# Patient Record
Sex: MALE | Race: WHITE | NOT HISPANIC OR LATINO | Employment: FULL TIME | URBAN - METROPOLITAN AREA
[De-identification: names, ages, dates, MRNs, and addresses within clinical notes are randomized per-mention and may not be internally consistent; named-entity substitution may affect disease eponyms.]

---

## 2019-06-04 ENCOUNTER — CONVERSION ENCOUNTER (OUTPATIENT)
Dept: FAMILY MEDICINE CLINIC | Facility: CLINIC | Age: 54
End: 2019-06-04

## 2019-06-05 VITALS
DIASTOLIC BLOOD PRESSURE: 87 MMHG | SYSTOLIC BLOOD PRESSURE: 139 MMHG | BODY MASS INDEX: 31.92 KG/M2 | OXYGEN SATURATION: 98 % | HEART RATE: 90 BPM | HEIGHT: 71 IN | WEIGHT: 228 LBS

## 2019-06-06 NOTE — PROGRESS NOTES
Visit Type:  Acute Visit  Referring Provider:  Nicki Galloway NP  Primary Provider:  Nicki Galloway NP    CC:  rash.    History of Present Illness:  54-year-old obese white male with history of GERD who comes in with severe large whelping hives all over extremities and back with some angioedema and swelling in the face after mowing in the field on Saturday.   He denies any dyspnea at this time or   difficulty breathing.   I am giving him Depo-Medrol 80 placing him on oral steroids Zyrtec and Zantac and he is to call me Thursday if he does not a great improvement.   I instructed patient if he develops any difficulty breathing or swallowing to go to   the emergency room immediately   blood pressure 138/86 heart rate 90 he denies any chest pain,  dyspnea,  tachycardia,  dizziness or edema     Depo-Medrol 80 mg IM now   prednisone 10 mg taper dose   Zyrtec 10 mg twice a day   Zantac 150 mg twice a day   off work till Friday      Vital Signs:    Patient Profile:    53 Years Old Male  Height:     71 inches  Weight:     228 pounds  BMI:        31.80     O2 Sat:     98 %  Temp:       98.0 degrees F oral  Pulse rate: 90 / minute  Pulse rhythm:   regular  BP Sittin / 87  (right arm)    Cuff size:  large      Problems: Active problems were reviewed with the patient during this visit.  Medications: Medications were reviewed with the patient during this visit.  Allergies: Allergies were reviewed with the patient during this visit.  No Known Allergy.  No Known Drug Allergy.        Vitals Entered By: Ruth Hernandez MA (2019 1:13 PM)    Past History  Past Medical History (reviewed - no changes required): None  Surgical History (reviewed - no changes required): None  Family History (reviewed - no changes required): None  Social History (reviewed - no changes required): None    Family History Summary:      Reviewed history Last on 2019 and no changes required:2019      General Comments -  FH:  None    Social History:     Reviewed history from 03/14/2019 and no changes required:        None      Risk Factors:     Smoked Tobacco Use:  Never smoker  Smokeless Tobacco Use:  Never  Alcohol use:  no  Exercise:  yes  Seatbelt use:  100 %  Sun Exposure:  occasionally        Review of Systems     General        skin rash      Physical Exam    General:      obese.    Ears:      L TM bulging and R TM bulging.    Nose:      turbinates swollen.    Mouth:      post nasal drip.    Lungs:      clear bilaterally to auscultation.    Heart:      non-displaced PMI, chest non-tender; regular rate and rhythm, S1, S2 without murmurs, rubs, or gallops  Abdomen:       normal bowel sounds; no hepatosplenomegaly no ventral,umbilical hernias or masses noted.    Msk:      no deformity or scoliosis noted of thoracic or lumbar spine.    Extremities:      no clubbing, cyanosis, edema, or deformity noted with normal full range of motion of joints of all four extremities   Neurologic:      no focal deficits, cranial nerves II-XII grossly intact with normal sensation, reflexes, coordination, muscle strength and tone.    Skin:       large groups of red whelping hives covering entire body with mild angioedema and swelling of face.   Patient said on Saturday and actually had some mild shortness of air but that has resolved  Psych:      alert and cooperative; normal mood and affect; normal attention span and concentration.        Blood Pressure:  Today's BP: 139/87 mm Hg          Impression & Recommendations:    Problem # 1:  Rash (ICD-782.1) (YMX40-M40)  Assessment: New    Orders:  Depo-Medrol 80 mg ()  Administration of Injections  (42528)      Medications Added to Medication List This Visit:  1)  Ranitidine Hcl 150 Mg Oral Tablet (Ranitidine hcl) .... Take one (1) tablet by mouth twice a day  2)  Zyrtec Allergy 10 Mg Oral Tablet (Cetirizine hcl) .... Take one (1) tablet by mouth twice a day  3)  Prednisone 10 Mg Oral Tablet  (Prednisone) .... Take 4 tabs x 2 days then 3 tabs x 2 days, 2 tabs x 2 days and 1 tab x 2 days.    Complete Medication List:  1)  Ranitidine Hcl 150 Mg Oral Tablet (Ranitidine hcl) .... Take one (1) tablet by mouth twice a day  2)  Zyrtec Allergy 10 Mg Oral Tablet (Cetirizine hcl) .... Take one (1) tablet by mouth twice a day  3)  Prednisone 10 Mg Oral Tablet (Prednisone) .... Take 4 tabs x 2 days then 3 tabs x 2 days, 2 tabs x 2 days and 1 tab x 2 days.  4)  Atorvastatin Calcium 10 Mg Oral Tablet (Atorvastatin calcium) .... One tab at bedtime  5)  Omeprazole 20 Mg Oral Tablet Delayed Release (Omeprazole) .... Take 1 tablet by mouth daily  6)  Aleve 220 Mg Oral Capsule (Naproxen sodium) .... Take one (1) tablet by mouth twice a day prn        Patient Instructions:  1)   take steroids Zyrtec and Zantac as directed and call Thursday if no improvement rash  2)   avoid sunlight,  hot water,  fragrances or sweating  3)   if rash worsens or if difficulty breathing go to the emergency room immediately  4)   off work till Friday    Medications:  RANITIDINE  MG ORAL TABLET (RANITIDINE HCL) Take one (1) tablet by mouth twice a day  #20[Tablet] x 0      Entered by: Ruth Hernandez MA      Authorized by:  Nicki Galloway NP      Electronically signed by:   Ruth Hernandez MA on 06/04/2019      Method used:    Electronically to               [facility] (retail)              [place]  [address]              Ph: [phone]              Fax: [phone]      Note to Pharmacy: Route: ORAL;       RxID:   [id number]  ZYRTEC ALLERGY 10 MG ORAL TABLET (CETIRIZINE HCL) Take one (1) tablet by mouth twice a day  #20[Tablet] x 0      Entered by: Ruth Hernandez MA      Authorized by:  Nicki Galloway NP      Electronically signed by:   Ruth Hernandez MA on 06/04/2019      Method used:    Electronically to               [facility] (retail)              [address]  [place]  [address]              Ph: [phone]              Fax: [phone]      Note to Pharmacy: Route: ORAL;       RxID:   [id number]  PREDNISONE 10 MG ORAL TABLET (PREDNISONE) Take 4 tabs x 2 days then 3 tabs x 2 days, 2 tabs x 2 days and 1 tab x 2 days.  #20[Tablet] x 0      Entered by: Ruth Hernandez MA      Authorized by:  Nicki Galloway NP      Electronically signed by:   Ruth Hernandez MA on 06/04/2019      Method used:    Electronically to               [id number]* (retail)              [address] [facility] [place]  [address]              Ph: [phone]              Fax: [phone]      Note to Pharmacy: Route: ORAL;       RxID:   [id number]                Medication Administration    Injection # 1:     Medication: Depomedrol 80mg     Diagnosis: Rash (ICD-782.1) (NOI25-N30)     Route: IM     Site: L deltoid     Exp Date: 08/31/2019     Lot #: [id number]     Mfr: Pharmacia     Given by: Ruth Hernandez MA     Date Given:06/04/2019 1:51 PM     Comments: NDC # [id number]     Tolerated w/o complications    Orders Added:  1)  Depo-Medrol 80 mg []  2)  Administration of Injections  [[id number]]  3)  Ofc Vst, Est Level III [50936]  ]      Electronically signed by Nicki Galloway NP on 06/04/2019 at 2:52 PM  ________________________________________________________________________       Disclaimer: Converted Note message may not contain all data elements that existed in the legacy source system. Please see [employer] for the original note details.

## 2020-07-15 ENCOUNTER — OFFICE VISIT (OUTPATIENT)
Dept: FAMILY MEDICINE CLINIC | Facility: CLINIC | Age: 55
End: 2020-07-15

## 2020-07-15 VITALS
WEIGHT: 233 LBS | SYSTOLIC BLOOD PRESSURE: 154 MMHG | HEART RATE: 87 BPM | TEMPERATURE: 98 F | OXYGEN SATURATION: 97 % | BODY MASS INDEX: 31.56 KG/M2 | DIASTOLIC BLOOD PRESSURE: 72 MMHG | HEIGHT: 72 IN

## 2020-07-15 DIAGNOSIS — N52.9 ERECTILE DYSFUNCTION, UNSPECIFIED ERECTILE DYSFUNCTION TYPE: ICD-10-CM

## 2020-07-15 DIAGNOSIS — Z12.5 SCREENING PSA (PROSTATE SPECIFIC ANTIGEN): ICD-10-CM

## 2020-07-15 DIAGNOSIS — K21.9 GASTROESOPHAGEAL REFLUX DISEASE WITHOUT ESOPHAGITIS: Primary | ICD-10-CM

## 2020-07-15 DIAGNOSIS — Z00.00 PREVENTATIVE HEALTH CARE: ICD-10-CM

## 2020-07-15 DIAGNOSIS — Z12.11 COLON CANCER SCREENING: ICD-10-CM

## 2020-07-15 DIAGNOSIS — N40.1 BENIGN PROSTATIC HYPERPLASIA WITH LOWER URINARY TRACT SYMPTOMS, SYMPTOM DETAILS UNSPECIFIED: ICD-10-CM

## 2020-07-15 PROCEDURE — 99214 OFFICE O/P EST MOD 30 MIN: CPT | Performed by: NURSE PRACTITIONER

## 2020-07-15 RX ORDER — SILDENAFIL 100 MG/1
100 TABLET, FILM COATED ORAL DAILY PRN
Qty: 10 TABLET | Refills: 2 | Status: SHIPPED | OUTPATIENT
Start: 2020-07-15 | End: 2021-06-21 | Stop reason: SDUPTHER

## 2020-07-15 RX ORDER — TAMSULOSIN HYDROCHLORIDE 0.4 MG/1
1 CAPSULE ORAL DAILY
Qty: 30 CAPSULE | Refills: 2 | Status: SHIPPED | OUTPATIENT
Start: 2020-07-15 | End: 2020-10-12

## 2020-07-15 NOTE — PATIENT INSTRUCTIONS
Flomax as directed if dribbling does not improve call office  Viagra as needed for ED  Schedule appointment for blood work fasting  Keep appointment for colonoscopy  Schedule eye exam

## 2020-07-15 NOTE — PROGRESS NOTES
"    Gomez Mercedes is a 54 y.o. male.     54-year-old obese white male with history of GERD who comes in today for annual visit and fasting blood work.  Patient has new complaint of dribbling toward the end of urination when he is trying to finish urinating.  He states this is been going on for couple years but is getting worse now. he denies any weakened stream or more frequent urination or nocturia.  Last PSA was normal I am placing him on Flomax and will be repeating PSA for this year.  If symptoms do not resolve we will send to urology  Blood pressure 154/72 heart rate 86 he denies any chest pain, dyspnea, tachycardia or dizziness.  Patient's blood pressures run in the 120s to 130s over 70s and 80s at home  Weight is up 5 pounds at 233 and we discussed diet weight loss  Patient's GERD has not been acting up he is doing a lot better  He does have some problems with erectile dysfunction and at his request I am ordering him some Viagra  Patient needs to schedule an eye exam.  I am scheduling him a colonoscopy and PSA      Flomax 0.4 mg daily  Viagra 100 mg one fourth tab as needed  Fasting blood work  Colonoscopy  Schedule eye exam         The following portions of the patient's history were reviewed and updated as appropriate: allergies, current medications, past family history, past medical history, past social history, past surgical history and problem list.    Vitals:    07/15/20 1458   BP: 154/72   BP Location: Right arm   Patient Position: Sitting   Cuff Size: Large Adult   Pulse: 87   Temp: 98 °F (36.7 °C)   TempSrc: Temporal   SpO2: 97%   Weight: 106 kg (233 lb)   Height: 181.6 cm (71.5\")     Body mass index is 32.04 kg/m².    History reviewed. No pertinent past medical history.  Past Surgical History:   Procedure Laterality Date   • VASECTOMY       Family History   Problem Relation Age of Onset   • Diabetes Father        There is no immunization history on file for this patient.    No results found for " any previous visit.         Review of Systems   Constitutional: Negative.    HENT: Negative.    Respiratory: Negative.    Cardiovascular: Negative.    Gastrointestinal: Negative.    Genitourinary: Positive for decreased libido.        Dribbling at end of urination that is worsening   Musculoskeletal: Negative.    Skin: Negative.    Neurological: Negative.    Psychiatric/Behavioral: Negative.        Objective   Physical Exam   Constitutional: He appears well-developed and well-nourished.   Cardiovascular: Normal rate and regular rhythm.   Pulmonary/Chest: Effort normal and breath sounds normal.   Abdominal: Soft. Bowel sounds are normal.   Musculoskeletal: Normal range of motion.   Neurological: He is alert.   Skin: Skin is warm and dry.   Psychiatric: He has a normal mood and affect.       Procedures    Assessment/Plan   Gomez was seen today for hypertension and bladder issues.    Diagnoses and all orders for this visit:    Gastroesophageal reflux disease without esophagitis    Benign prostatic hyperplasia with lower urinary tract symptoms, symptom details unspecified    Erectile dysfunction, unspecified erectile dysfunction type    Preventative health care  -     CBC & Differential  -     Comprehensive Metabolic Panel  -     Lipid Panel With LDL / HDL Ratio    Screening PSA (prostate specific antigen)  -     PSA Screen    Other orders  -     tamsulosin (FLOMAX) 0.4 MG capsule 24 hr capsule; Take 1 capsule by mouth Daily.  -     sildenafil (Viagra) 100 MG tablet; Take 1 tablet by mouth Daily As Needed for Erectile Dysfunction.          Current Outpatient Medications:   •  sildenafil (Viagra) 100 MG tablet, Take 1 tablet by mouth Daily As Needed for Erectile Dysfunction., Disp: 10 tablet, Rfl: 2  •  tamsulosin (FLOMAX) 0.4 MG capsule 24 hr capsule, Take 1 capsule by mouth Daily., Disp: 30 capsule, Rfl: 2

## 2020-10-12 RX ORDER — TAMSULOSIN HYDROCHLORIDE 0.4 MG/1
CAPSULE ORAL
Qty: 30 CAPSULE | Refills: 2 | Status: SHIPPED | OUTPATIENT
Start: 2020-10-12 | End: 2021-05-18

## 2020-11-11 PROCEDURE — U0003 INFECTIOUS AGENT DETECTION BY NUCLEIC ACID (DNA OR RNA); SEVERE ACUTE RESPIRATORY SYNDROME CORONAVIRUS 2 (SARS-COV-2) (CORONAVIRUS DISEASE [COVID-19]), AMPLIFIED PROBE TECHNIQUE, MAKING USE OF HIGH THROUGHPUT TECHNOLOGIES AS DESCRIBED BY CMS-2020-01-R: HCPCS | Performed by: FAMILY MEDICINE

## 2020-11-13 ENCOUNTER — TELEPHONE (OUTPATIENT)
Dept: URGENT CARE | Facility: CLINIC | Age: 55
End: 2020-11-13

## 2021-05-18 ENCOUNTER — OFFICE VISIT (OUTPATIENT)
Dept: FAMILY MEDICINE CLINIC | Facility: CLINIC | Age: 56
End: 2021-05-18

## 2021-05-18 VITALS
DIASTOLIC BLOOD PRESSURE: 86 MMHG | SYSTOLIC BLOOD PRESSURE: 138 MMHG | HEART RATE: 67 BPM | HEIGHT: 72 IN | TEMPERATURE: 97.7 F | BODY MASS INDEX: 27.87 KG/M2 | OXYGEN SATURATION: 98 % | WEIGHT: 205.8 LBS

## 2021-05-18 DIAGNOSIS — Z12.11 COLON CANCER SCREENING: Primary | ICD-10-CM

## 2021-05-18 DIAGNOSIS — Z12.5 SCREENING PSA (PROSTATE SPECIFIC ANTIGEN): ICD-10-CM

## 2021-05-18 DIAGNOSIS — N52.8 OTHER MALE ERECTILE DYSFUNCTION: ICD-10-CM

## 2021-05-18 DIAGNOSIS — Z00.00 PREVENTATIVE HEALTH CARE: ICD-10-CM

## 2021-05-18 DIAGNOSIS — G44.40 DRUG-INDUCED HEADACHE, NOT ELSEWHERE CLASSIFIED, NOT INTRACTABLE: ICD-10-CM

## 2021-05-18 PROBLEM — R51.9 HEADACHE: Status: ACTIVE | Noted: 2021-05-18

## 2021-05-18 PROBLEM — I10 ESSENTIAL HYPERTENSION: Status: ACTIVE | Noted: 2021-05-18

## 2021-05-18 PROCEDURE — 99396 PREV VISIT EST AGE 40-64: CPT | Performed by: NURSE PRACTITIONER

## 2021-05-18 RX ORDER — AMLODIPINE BESYLATE 2.5 MG/1
2.5 TABLET ORAL
Qty: 30 TABLET | Refills: 2 | Status: SHIPPED | OUTPATIENT
Start: 2021-05-18 | End: 2021-06-26

## 2021-05-18 NOTE — PATIENT INSTRUCTIONS
Fasting blood work  Amlodipine 2.5 mg nightly  Monitor blood pressures with parameters given  Take Tylenol before Viagra to see if it helps with headaches  Covid vaccine as discussed  Schedule colonoscopy  Follow-up 6 months fasting

## 2021-05-18 NOTE — PROGRESS NOTES
"    Gomez Mercedes is a 55 y.o. male.     55-year-old white male with history of GERD who comes in today for annual visit and fasting blood work  Blood pressure 138/86 heart rate 66 patient states his pressure has been a little higher than normal.  He is on no medications for blood pressure unguinal put him on a very low-dose of amlodipine since his diastolic seems to be his main issue patient is going to monitor blood pressures and call the office if they are not in the parameters I gave him  Patient also takes Viagra for ED and states they give him headaches and I explained the reason why he gets headaches from the Viagra I instructed patient to take Tylenol before taking the Viagra to see if that would help  Patient has lost 27 pounds since I saw him with a weight of 206 a BMI 27.9 he has been using weight loss supplements which I am sure have stimulants which may be the cause for his elevated blood pressure also.  He does not do a lot of caffeine  Patient has not had a Covid vaccine we discussed that and he is probably going to end up getting the Pfizer  He is up-to-date on eye exam I am drawing a PSA today and he is going to schedule his colonoscopy in a green packet was given for this summer    Fasting blood work  Amlodipine 2.5 mg nightly  Monitor blood pressures with parameters given  Take Tylenol before Viagra to see if it helps with headaches  Covid vaccine as discussed  Schedule colonoscopy  Follow-up 6 months fasting       The following portions of the patient's history were reviewed and updated as appropriate: allergies, current medications, past family history, past medical history, past social history, past surgical history and problem list.    Vitals:    05/18/21 0846   BP: 138/86   BP Location: Right arm   Patient Position: Sitting   Cuff Size: Large Adult   Pulse: 67   Temp: 97.7 °F (36.5 °C)   TempSrc: Temporal   SpO2: 98%   Weight: 93.4 kg (205 lb 12.8 oz)   Height: 182.9 cm (72\")     Body " mass index is 27.91 kg/m².    History reviewed. No pertinent past medical history.  Past Surgical History:   Procedure Laterality Date   • VASECTOMY       Family History   Problem Relation Age of Onset   • Diabetes Father      Immunization History   Administered Date(s) Administered   • COVID-19 (PFIZER) 03/17/2021, 04/07/2021       Admission on 11/11/2020, Discharged on 11/11/2020   Component Date Value Ref Range Status   • SARS-CoV-2, FLAQUITO 11/11/2020 Detected* Not Detected Final    Comment: This nucleic acid amplification test was developed and its performance  characteristics determined by [employer]. Nucleic acid  amplification tests include PCR and TMA. This test has not been FDA  cleared or approved. This test has been authorized by FDA under an  Emergency Use Authorization (EUA). This test is only authorized for  the duration of time the declaration that circumstances exist  justifying the authorization of the emergency use of in vitro  diagnostic tests for detection of SARS-CoV-2 virus and/or diagnosis  of COVID-19 infection under section 564(b)(1) of the Act, 21 U.S.C.  360bbb-3(b) (1), unless the authorization is terminated or revoked  sooner.  When diagnostic testing is negative, the possibility of a false  negative result should be considered in the context of a patient's  recent exposures and the presence of clinical signs and symptoms  consistent with COVID-19. An individual without symptoms of COVID-19  and who is not shedding SARS-CoV-2 virus would                            expect to have a  negative (not detected) result in this assay.         Review of Systems   Constitutional: Negative.    HENT: Negative.    Respiratory: Negative.    Cardiovascular: Negative.    Gastrointestinal: Negative.    Genitourinary: Positive for erectile dysfunction.   Musculoskeletal: Negative.    Skin: Negative.    Neurological: Positive for headache.       Objective   Physical Exam  Constitutional:        Appearance: Normal appearance.   HENT:      Head: Normocephalic.   Cardiovascular:      Rate and Rhythm: Normal rate and regular rhythm.      Pulses: Normal pulses.      Heart sounds: Normal heart sounds.   Pulmonary:      Effort: Pulmonary effort is normal.   Abdominal:      General: Bowel sounds are normal.   Musculoskeletal:         General: Normal range of motion.      Cervical back: Normal range of motion.   Skin:     General: Skin is warm and dry.   Neurological:      General: No focal deficit present.      Mental Status: He is alert and oriented to person, place, and time.   Psychiatric:         Mood and Affect: Mood normal.         Behavior: Behavior normal.         Procedures    Assessment/Plan   Diagnoses and all orders for this visit:    1. Colon cancer screening (Primary)  -     Ambulatory Referral For Screening Colonoscopy    2. Preventative health care  -     CBC & Differential  -     Comprehensive Metabolic Panel  -     Lipid Panel With LDL / HDL Ratio    3. Screening PSA (prostate specific antigen)  -     PSA Screen    4. Other male erectile dysfunction    5. Drug-induced headache, not elsewhere classified, not intractable    6. BMI 27.0-27.9,adult    Other orders  -     amLODIPine (NORVASC) 2.5 MG tablet; Take 1 tablet by mouth every night at bedtime.  Dispense: 30 tablet; Refill: 2          Current Outpatient Medications:   •  amLODIPine (NORVASC) 2.5 MG tablet, Take 1 tablet by mouth every night at bedtime., Disp: 30 tablet, Rfl: 2  •  sildenafil (Viagra) 100 MG tablet, Take 1 tablet by mouth Daily As Needed for Erectile Dysfunction., Disp: 10 tablet, Rfl: 2

## 2021-05-19 LAB
ALBUMIN SERPL-MCNC: 4.3 G/DL (ref 3.8–4.9)
ALBUMIN/GLOB SERPL: 1.8 {RATIO} (ref 1.2–2.2)
ALP SERPL-CCNC: 75 IU/L (ref 48–121)
ALT SERPL-CCNC: 25 IU/L (ref 0–44)
AST SERPL-CCNC: 20 IU/L (ref 0–40)
BASOPHILS # BLD AUTO: 0 X10E3/UL (ref 0–0.2)
BASOPHILS NFR BLD AUTO: 1 %
BILIRUB SERPL-MCNC: <0.2 MG/DL (ref 0–1.2)
BUN SERPL-MCNC: 19 MG/DL (ref 6–24)
BUN/CREAT SERPL: 19 (ref 9–20)
CALCIUM SERPL-MCNC: 9.3 MG/DL (ref 8.7–10.2)
CHLORIDE SERPL-SCNC: 102 MMOL/L (ref 96–106)
CHOLEST SERPL-MCNC: 166 MG/DL (ref 100–199)
CO2 SERPL-SCNC: 27 MMOL/L (ref 20–29)
CREAT SERPL-MCNC: 0.99 MG/DL (ref 0.76–1.27)
EOSINOPHIL # BLD AUTO: 0.1 X10E3/UL (ref 0–0.4)
EOSINOPHIL NFR BLD AUTO: 2 %
ERYTHROCYTE [DISTWIDTH] IN BLOOD BY AUTOMATED COUNT: 13.3 % (ref 11.6–15.4)
GLOBULIN SER CALC-MCNC: 2.4 G/DL (ref 1.5–4.5)
GLUCOSE SERPL-MCNC: 104 MG/DL (ref 65–99)
HCT VFR BLD AUTO: 42.4 % (ref 37.5–51)
HDLC SERPL-MCNC: 46 MG/DL
HGB BLD-MCNC: 14.1 G/DL (ref 13–17.7)
IMM GRANULOCYTES # BLD AUTO: 0 X10E3/UL (ref 0–0.1)
IMM GRANULOCYTES NFR BLD AUTO: 0 %
LDLC SERPL CALC-MCNC: 98 MG/DL (ref 0–99)
LDLC/HDLC SERPL: 2.1 RATIO (ref 0–3.6)
LYMPHOCYTES # BLD AUTO: 3.2 X10E3/UL (ref 0.7–3.1)
LYMPHOCYTES NFR BLD AUTO: 56 %
MCH RBC QN AUTO: 30.9 PG (ref 26.6–33)
MCHC RBC AUTO-ENTMCNC: 33.3 G/DL (ref 31.5–35.7)
MCV RBC AUTO: 93 FL (ref 79–97)
MONOCYTES # BLD AUTO: 0.3 X10E3/UL (ref 0.1–0.9)
MONOCYTES NFR BLD AUTO: 6 %
NEUTROPHILS # BLD AUTO: 2 X10E3/UL (ref 1.4–7)
NEUTROPHILS NFR BLD AUTO: 35 %
PLATELET # BLD AUTO: 276 X10E3/UL (ref 150–450)
POTASSIUM SERPL-SCNC: 4.7 MMOL/L (ref 3.5–5.2)
PROT SERPL-MCNC: 6.7 G/DL (ref 6–8.5)
PSA SERPL-MCNC: 3.1 NG/ML (ref 0–4)
RBC # BLD AUTO: 4.56 X10E6/UL (ref 4.14–5.8)
SODIUM SERPL-SCNC: 142 MMOL/L (ref 134–144)
TRIGL SERPL-MCNC: 120 MG/DL (ref 0–149)
VLDLC SERPL CALC-MCNC: 22 MG/DL (ref 5–40)
WBC # BLD AUTO: 5.6 X10E3/UL (ref 3.4–10.8)

## 2021-06-21 NOTE — TELEPHONE ENCOUNTER
Caller: Gomez Mercedes    Relationship: Self    Best call back number: [phone]   Medication needed:   Requested Prescriptions     Pending Prescriptions Disp Refills   • sildenafil (Viagra) 100 MG tablet 10 tablet 2     Sig: Take 1 tablet by mouth Daily As Needed for Erectile Dysfunction.       When do you need the refill by: 06/21/21    What additional details did the patient provide when requesting the medication: PATIENT STATES HE IS OUT OF THIS MEDICATION    Does the patient have less than a 3 day supply:  [x] Yes  [] No    What is the patient's preferred pharmacy: [facility] [address] [place] [address] [phone] [facility] [phone]

## 2021-06-22 RX ORDER — SILDENAFIL 100 MG/1
100 TABLET, FILM COATED ORAL DAILY PRN
Qty: 10 TABLET | Refills: 2 | Status: SHIPPED | OUTPATIENT
Start: 2021-06-22 | End: 2021-12-01

## 2021-06-26 RX ORDER — AMLODIPINE BESYLATE 2.5 MG/1
2.5 TABLET ORAL
Qty: 90 TABLET | Refills: 0 | Status: SHIPPED | OUTPATIENT
Start: 2021-06-26 | End: 2021-11-23

## 2021-08-20 ENCOUNTER — ON CAMPUS - OUTPATIENT (AMBULATORY)
Dept: URBAN - METROPOLITAN AREA HOSPITAL 2 | Facility: HOSPITAL | Age: 56
End: 2021-08-20
Payer: COMMERCIAL

## 2021-08-20 VITALS
DIASTOLIC BLOOD PRESSURE: 66 MMHG | HEART RATE: 60 BPM | SYSTOLIC BLOOD PRESSURE: 103 MMHG | SYSTOLIC BLOOD PRESSURE: 141 MMHG | DIASTOLIC BLOOD PRESSURE: 88 MMHG | SYSTOLIC BLOOD PRESSURE: 118 MMHG | DIASTOLIC BLOOD PRESSURE: 70 MMHG | DIASTOLIC BLOOD PRESSURE: 74 MMHG | SYSTOLIC BLOOD PRESSURE: 108 MMHG | OXYGEN SATURATION: 98 % | HEART RATE: 69 BPM | DIASTOLIC BLOOD PRESSURE: 77 MMHG | WEIGHT: 195 LBS | TEMPERATURE: 97.8 F | DIASTOLIC BLOOD PRESSURE: 72 MMHG | HEART RATE: 84 BPM | HEART RATE: 68 BPM | OXYGEN SATURATION: 100 % | SYSTOLIC BLOOD PRESSURE: 124 MMHG | RESPIRATION RATE: 16 BRPM | SYSTOLIC BLOOD PRESSURE: 119 MMHG | RESPIRATION RATE: 17 BRPM | HEART RATE: 65 BPM | HEART RATE: 63 BPM | DIASTOLIC BLOOD PRESSURE: 87 MMHG | RESPIRATION RATE: 15 BRPM | OXYGEN SATURATION: 99 % | SYSTOLIC BLOOD PRESSURE: 140 MMHG | HEART RATE: 72 BPM | HEART RATE: 62 BPM | RESPIRATION RATE: 18 BRPM | RESPIRATION RATE: 12 BRPM | HEIGHT: 71 IN | SYSTOLIC BLOOD PRESSURE: 105 MMHG

## 2021-08-20 DIAGNOSIS — Z12.11 ENCOUNTER FOR SCREENING FOR MALIGNANT NEOPLASM OF COLON: ICD-10-CM

## 2021-08-20 DIAGNOSIS — K64.8 OTHER HEMORRHOIDS: ICD-10-CM

## 2021-08-20 PROCEDURE — 45378 DIAGNOSTIC COLONOSCOPY: CPT | Mod: 33 | Performed by: INTERNAL MEDICINE

## 2021-11-23 ENCOUNTER — OFFICE VISIT (OUTPATIENT)
Dept: FAMILY MEDICINE CLINIC | Facility: CLINIC | Age: 56
End: 2021-11-23

## 2021-11-23 VITALS
SYSTOLIC BLOOD PRESSURE: 140 MMHG | WEIGHT: 208.8 LBS | HEART RATE: 78 BPM | DIASTOLIC BLOOD PRESSURE: 90 MMHG | TEMPERATURE: 97.6 F | BODY MASS INDEX: 28.28 KG/M2 | HEIGHT: 72 IN | OXYGEN SATURATION: 99 %

## 2021-11-23 DIAGNOSIS — I10 ESSENTIAL HYPERTENSION: ICD-10-CM

## 2021-11-23 DIAGNOSIS — Z00.00 PREVENTATIVE HEALTH CARE: ICD-10-CM

## 2021-11-23 DIAGNOSIS — Z13.220 LIPID SCREENING: Primary | ICD-10-CM

## 2021-11-23 PROCEDURE — 99213 OFFICE O/P EST LOW 20 MIN: CPT | Performed by: NURSE PRACTITIONER

## 2021-11-23 RX ORDER — AMLODIPINE BESYLATE 5 MG/1
5 TABLET ORAL
Qty: 30 TABLET | Refills: 2 | Status: SHIPPED | OUTPATIENT
Start: 2021-11-23 | End: 2022-04-11

## 2021-11-23 RX ORDER — TAMSULOSIN HYDROCHLORIDE 0.4 MG/1
1 CAPSULE ORAL DAILY
COMMUNITY
Start: 2021-09-08 | End: 2023-01-30

## 2021-11-23 NOTE — PROGRESS NOTES
"    Gomez Mercedes is a 56 y.o. male.     56-year-old white male with history of GERD and hypertension who comes in today for follow-up visit  Blood pressure 140/90 heart rate 78 he denies any chest pain, dyspnea, tachycardia or dizziness.  Patient was placed on amlodipine last visit 2.5 I am increasing that to 5 mg and gave him parameters to follow and call the office if not in those parameters    Weight is up 3 pounds at 209 with a BMI of 28.3    Patient has had 3 Pfizer vaccines, is up-to-date on eye exam PSA and just had a colonoscopy that was normal that is due in 10 years            Fasting blood work  Increase amlodipine to 5 mg daily  Monitor blood pressure for parameters given and call the office  Follow-up 6 months       The following portions of the patient's history were reviewed and updated as appropriate: allergies, current medications, past family history, past medical history, past social history, past surgical history and problem list.    Vitals:    11/23/21 0809   BP: 140/90   BP Location: Left arm   Patient Position: Sitting   Cuff Size: Large Adult   Pulse: 78   Temp: 97.6 °F (36.4 °C)   TempSrc: Temporal   SpO2: 99%   Weight: 94.7 kg (208 lb 12.8 oz)   Height: 182.9 cm (72\")     Body mass index is 28.32 kg/m².    Past Medical History:   Diagnosis Date   • Hypertension      Past Surgical History:   Procedure Laterality Date   • VASECTOMY       Family History   Problem Relation Age of Onset   • Diabetes Father      Immunization History   Administered Date(s) Administered   • COVID-19 (PFIZER) 03/17/2021, 04/07/2021, 10/09/2021       Office Visit on 05/18/2021   Component Date Value Ref Range Status   • WBC 05/18/2021 5.6  3.4 - 10.8 x10E3/uL Final   • RBC 05/18/2021 4.56  4.14 - 5.80 x10E6/uL Final   • Hemoglobin 05/18/2021 14.1  13.0 - 17.7 g/dL Final   • Hematocrit 05/18/2021 42.4  37.5 - 51.0 % Final   • MCV 05/18/2021 93  79 - 97 fL Final   • MCH 05/18/2021 30.9  26.6 - 33.0 pg Final   • " MCHC 05/18/2021 33.3  31.5 - 35.7 g/dL Final   • RDW 05/18/2021 13.3  11.6 - 15.4 % Final   • Platelets 05/18/2021 276  150 - 450 x10E3/uL Final   • Neutrophil Rel % 05/18/2021 35  Not Estab. % Final   • Lymphocyte Rel % 05/18/2021 56  Not Estab. % Final   • Monocyte Rel % 05/18/2021 6  Not Estab. % Final   • Eosinophil Rel % 05/18/2021 2  Not Estab. % Final   • Basophil Rel % 05/18/2021 1  Not Estab. % Final   • Neutrophils Absolute 05/18/2021 2.0  1.4 - 7.0 x10E3/uL Final   • Lymphocytes Absolute 05/18/2021 3.2* 0.7 - 3.1 x10E3/uL Final   • Monocytes Absolute 05/18/2021 0.3  0.1 - 0.9 x10E3/uL Final   • Eosinophils Absolute 05/18/2021 0.1  0.0 - 0.4 x10E3/uL Final   • Basophils Absolute 05/18/2021 0.0  0.0 - 0.2 x10E3/uL Final   • Immature Granulocyte Rel % 05/18/2021 0  Not Estab. % Final   • Immature Grans Absolute 05/18/2021 0.0  0.0 - 0.1 x10E3/uL Final   • Glucose 05/18/2021 104* 65 - 99 mg/dL Final   • BUN 05/18/2021 19  6 - 24 mg/dL Final   • Creatinine 05/18/2021 0.99  0.76 - 1.27 mg/dL Final   • eGFR Non  Am 05/18/2021 87  >59 mL/min/1.73 Final   • eGFR African Am 05/18/2021 100  >59 mL/min/1.73 Final    Comment: **[facility] currently reports eGFR in compliance with the current**    recommendations of the National Kidney Foundation. [facility] will    update reporting as new guidelines are published from the NKF-ASN    Task force.     • BUN/Creatinine Ratio 05/18/2021 19  9 - 20 Final   • Sodium 05/18/2021 142  134 - 144 mmol/L Final   • Potassium 05/18/2021 4.7  3.5 - 5.2 mmol/L Final   • Chloride 05/18/2021 102  96 - 106 mmol/L Final   • Total CO2 05/18/2021 27  20 - 29 mmol/L Final   • Calcium 05/18/2021 9.3  8.7 - 10.2 mg/dL Final   • Total Protein 05/18/2021 6.7  6.0 - 8.5 g/dL Final   • Albumin 05/18/2021 4.3  3.8 - 4.9 g/dL Final   • Globulin 05/18/2021 2.4  1.5 - 4.5 g/dL Final   • A/G Ratio 05/18/2021 1.8  1.2 - 2.2 Final   • Total Bilirubin 05/18/2021 <0.2  0.0 - 1.2 mg/dL Final   • Alkaline  Phosphatase 05/18/2021 75  48 - 121 IU/L Final                  **Please note reference interval change**   • AST (SGOT) 05/18/2021 20  0 - 40 IU/L Final   • ALT (SGPT) 05/18/2021 25  0 - 44 IU/L Final   • Total Cholesterol 05/18/2021 166  100 - 199 mg/dL Final   • Triglycerides 05/18/2021 120  0 - 149 mg/dL Final   • HDL Cholesterol 05/18/2021 46  >39 mg/dL Final   • VLDL Cholesterol [place] 05/18/2021 22  5 - 40 mg/dL Final   • LDL Chol Calc (NIH) 05/18/2021 98  0 - 99 mg/dL Final   • LDL/HDL RATIO 05/18/2021 2.1  0.0 - 3.6 ratio Final    Comment:                                     LDL/HDL Ratio                                              Men  Women                                1/2 Avg.Risk  1.0    1.5                                    Avg.Risk  3.6    3.2                                 2X Avg.Risk  6.2    5.0                                 3X Avg.Risk  8.0    6.1     • PSA 05/18/2021 3.1  0.0 - 4.0 ng/mL Final    Comment: Roche ECLIA methodology.  According to the American Urological Association, Serum PSA should  decrease and remain at undetectable levels after radical  prostatectomy. The AUA defines biochemical recurrence as an initial  PSA value 0.2 ng/mL or greater followed by a subsequent confirmatory  PSA value 0.2 ng/mL or greater.  Values obtained with different assay methods or kits cannot be used  interchangeably. Results cannot be interpreted as absolute evidence  of the presence or absence of malignant disease.           Review of Systems   Constitutional: Negative.    HENT: Negative.    Respiratory: Negative.    Cardiovascular: Negative.    Gastrointestinal: Negative.    Genitourinary: Negative.    Musculoskeletal: Negative.    Skin: Negative.    Neurological: Negative.    Psychiatric/Behavioral: Negative.        Objective   Physical Exam  Constitutional:       Appearance: Normal appearance.   HENT:      Head: Normocephalic.   Cardiovascular:      Rate and Rhythm: Normal rate and regular rhythm.       Pulses: Normal pulses.   Pulmonary:      Effort: Pulmonary effort is normal.      Breath sounds: Normal breath sounds.   Abdominal:      General: Bowel sounds are normal.   Musculoskeletal:         General: Normal range of motion.   Skin:     General: Skin is warm and dry.   Neurological:      General: No focal deficit present.      Mental Status: He is alert and oriented to person, place, and time.   Psychiatric:         Mood and Affect: Mood normal.         Behavior: Behavior normal.         Procedures    Assessment/Plan   Diagnoses and all orders for this visit:    1. Lipid screening (Primary)  -     Lipid Panel With LDL / HDL Ratio    2. Preventative health care  -     Comprehensive Metabolic Panel    3. Essential hypertension    Other orders  -     amLODIPine (NORVASC) 5 MG tablet; Take 1 tablet by mouth every night at bedtime.  Dispense: 30 tablet; Refill: 2          Current Outpatient Medications:   •  sildenafil (Viagra) 100 MG tablet, Take 1 tablet by mouth Daily As Needed for Erectile Dysfunction., Disp: 10 tablet, Rfl: 2  •  tamsulosin (FLOMAX) 0.4 MG capsule 24 hr capsule, Take 1 capsule by mouth Daily., Disp: , Rfl:   •  amLODIPine (NORVASC) 5 MG tablet, Take 1 tablet by mouth every night at bedtime., Disp: 30 tablet, Rfl: 2

## 2021-11-23 NOTE — PATIENT INSTRUCTIONS
Fasting blood work  Increase amlodipine to 5 mg daily  Monitor blood pressure for parameters given and call the office  Follow-up 6 months

## 2021-11-24 LAB
ALBUMIN SERPL-MCNC: 4.8 G/DL (ref 3.8–4.9)
ALBUMIN/GLOB SERPL: 2 {RATIO} (ref 1.2–2.2)
ALP SERPL-CCNC: 72 IU/L (ref 44–121)
ALT SERPL-CCNC: 23 IU/L (ref 0–44)
AST SERPL-CCNC: 19 IU/L (ref 0–40)
BILIRUB SERPL-MCNC: 0.4 MG/DL (ref 0–1.2)
BUN SERPL-MCNC: 20 MG/DL (ref 6–24)
BUN/CREAT SERPL: 17 (ref 9–20)
CALCIUM SERPL-MCNC: 9.7 MG/DL (ref 8.7–10.2)
CHLORIDE SERPL-SCNC: 100 MMOL/L (ref 96–106)
CHOLEST SERPL-MCNC: 190 MG/DL (ref 100–199)
CO2 SERPL-SCNC: 26 MMOL/L (ref 20–29)
CREAT SERPL-MCNC: 1.18 MG/DL (ref 0.76–1.27)
GLOBULIN SER CALC-MCNC: 2.4 G/DL (ref 1.5–4.5)
GLUCOSE SERPL-MCNC: 110 MG/DL (ref 65–99)
HDLC SERPL-MCNC: 48 MG/DL
LDLC SERPL CALC-MCNC: 122 MG/DL (ref 0–99)
LDLC/HDLC SERPL: 2.5 RATIO (ref 0–3.6)
POTASSIUM SERPL-SCNC: 4.3 MMOL/L (ref 3.5–5.2)
PROT SERPL-MCNC: 7.2 G/DL (ref 6–8.5)
SODIUM SERPL-SCNC: 139 MMOL/L (ref 134–144)
TRIGL SERPL-MCNC: 110 MG/DL (ref 0–149)
VLDLC SERPL CALC-MCNC: 20 MG/DL (ref 5–40)

## 2021-12-01 RX ORDER — SILDENAFIL 100 MG/1
TABLET, FILM COATED ORAL
Qty: 10 TABLET | Refills: 0 | Status: SHIPPED | OUTPATIENT
Start: 2021-12-01 | End: 2021-12-22

## 2021-12-22 RX ORDER — SILDENAFIL 100 MG/1
TABLET, FILM COATED ORAL
Qty: 10 TABLET | Refills: 0 | Status: SHIPPED | OUTPATIENT
Start: 2021-12-22 | End: 2022-02-10

## 2022-02-10 RX ORDER — SILDENAFIL 100 MG/1
TABLET, FILM COATED ORAL
Qty: 10 TABLET | Refills: 0 | Status: SHIPPED | OUTPATIENT
Start: 2022-02-10 | End: 2022-04-11

## 2022-04-11 RX ORDER — AMLODIPINE BESYLATE 5 MG/1
TABLET ORAL
Qty: 30 TABLET | Refills: 0 | Status: SHIPPED | OUTPATIENT
Start: 2022-04-11 | End: 2022-05-23

## 2022-04-11 RX ORDER — SILDENAFIL 100 MG/1
TABLET, FILM COATED ORAL
Qty: 10 TABLET | Refills: 0 | Status: SHIPPED | OUTPATIENT
Start: 2022-04-11 | End: 2022-05-25

## 2022-05-18 ENCOUNTER — OFFICE VISIT (OUTPATIENT)
Dept: FAMILY MEDICINE CLINIC | Facility: CLINIC | Age: 57
End: 2022-05-18

## 2022-05-18 VITALS
DIASTOLIC BLOOD PRESSURE: 88 MMHG | SYSTOLIC BLOOD PRESSURE: 138 MMHG | HEART RATE: 64 BPM | OXYGEN SATURATION: 99 % | HEIGHT: 72 IN | TEMPERATURE: 97.7 F | BODY MASS INDEX: 28.25 KG/M2 | WEIGHT: 208.6 LBS

## 2022-05-18 DIAGNOSIS — M25.562 ACUTE PAIN OF LEFT KNEE: ICD-10-CM

## 2022-05-18 DIAGNOSIS — Z12.5 SCREENING PSA (PROSTATE SPECIFIC ANTIGEN): ICD-10-CM

## 2022-05-18 DIAGNOSIS — E78.00 ELEVATED LDL CHOLESTEROL LEVEL: ICD-10-CM

## 2022-05-18 DIAGNOSIS — R73.09 ELEVATED GLUCOSE: Primary | ICD-10-CM

## 2022-05-18 PROCEDURE — 99213 OFFICE O/P EST LOW 20 MIN: CPT | Performed by: NURSE PRACTITIONER

## 2022-05-18 NOTE — PROGRESS NOTES
"Gomez Mercedes is a 56 y.o. male.     56-year-old white male with GERD, hypertension who comes in today for follow-up visit    Blood pressure 138/88 heart rate 64 he denies any chest pain, dyspnea, tachycardia or dizziness    Patient states he hurt his knee playing basketball about 3 weeks ago it swelled up pretty badly and was painful.  He iced it quite a bit and states the swelling is much better but is still sore.  We will get an x-ray today if knee continues to be a problem we will send to Ortho    Weight is 209 with a BMI 28.3.  He has had 3 COVID vaccines is up-to-date on eye exam PSA and his colonoscopy is due in August 2031            Left knee x-ray  Ibuprofen and ice at home  Wear knee brace  If no improvement within 2 weeks call office for orthopedic referral  Fasting blood work follow-up 6 months         The following portions of the patient's history were reviewed and updated as appropriate: allergies, current medications, past family history, past medical history, past social history, past surgical history and problem list.    Vitals:    05/18/22 1019   BP: 138/88   BP Location: Right arm   Patient Position: Sitting   Cuff Size: Large Adult   Pulse: 64   Temp: 97.7 °F (36.5 °C)   TempSrc: Temporal   SpO2: 99%   Weight: 94.6 kg (208 lb 9.6 oz)   Height: 182.9 cm (72\")     Body mass index is 28.29 kg/m².    Past Medical History:   Diagnosis Date   • Hypertension      Past Surgical History:   Procedure Laterality Date   • VASECTOMY       Family History   Problem Relation Age of Onset   • Diabetes Father      Immunization History   Administered Date(s) Administered   • COVID-19 (PFIZER) PURPLE CAP 03/17/2021, 04/07/2021, 10/09/2021       Office Visit on 11/23/2021   Component Date Value Ref Range Status   • Glucose 11/23/2021 110 (A) 65 - 99 mg/dL Final   • BUN 11/23/2021 20  6 - 24 mg/dL Final   • Creatinine 11/23/2021 1.18  0.76 - 1.27 mg/dL Final   • eGFR Non  Am 11/23/2021 70  >59 " mL/min/1.73 Final   • eGFR  Am 11/23/2021 80  >59 mL/min/1.73 Final    Comment: **In accordance with recommendations from the NKF-ASN Task force,**    [facility] the [facility] eGFR calculation to the    2021 CKD-EPI creatinine equation that estimates kidney function    without a race variable.     • BUN/Creatinine Ratio 11/23/2021 17  9 - 20 Final   • Sodium 11/23/2021 139  134 - 144 mmol/L Final   • Potassium 11/23/2021 4.3  3.5 - 5.2 mmol/L Final   • Chloride 11/23/2021 100  96 - 106 mmol/L Final   • Total CO2 11/23/2021 26  20 - 29 mmol/L Final   • Calcium 11/23/2021 9.7  8.7 - 10.2 mg/dL Final   • Total Protein 11/23/2021 7.2  6.0 - 8.5 g/dL Final   • Albumin 11/23/2021 4.8  3.8 - 4.9 g/dL Final   • Globulin 11/23/2021 2.4  1.5 - 4.5 g/dL Final   • A/G Ratio 11/23/2021 2.0  1.2 - 2.2 Final   • Total Bilirubin 11/23/2021 0.4  0.0 - 1.2 mg/dL Final   • Alkaline Phosphatase 11/23/2021 72  44 - 121 IU/L Final                  **Please note reference interval change**   • AST (SGOT) 11/23/2021 19  0 - 40 IU/L Final   • ALT (SGPT) 11/23/2021 23  0 - 44 IU/L Final   • Total Cholesterol 11/23/2021 190  100 - 199 mg/dL Final   • Triglycerides 11/23/2021 110  0 - 149 mg/dL Final   • HDL Cholesterol 11/23/2021 48  >39 mg/dL Final   • VLDL Cholesterol [place] 11/23/2021 20  5 - 40 mg/dL Final   • LDL Chol Calc (NIH) 11/23/2021 122 (A) 0 - 99 mg/dL Final   • LDL/HDL RATIO 11/23/2021 2.5  0.0 - 3.6 ratio Final    Comment:                                     LDL/HDL Ratio                                              Men  Women                                1/2 Avg.Risk  1.0    1.5                                    Avg.Risk  3.6    3.2                                 2X Avg.Risk  6.2    5.0                                 3X Avg.Risk  8.0    6.1           Review of Systems   Constitutional: Negative.    HENT: Negative.    Respiratory: Negative.    Cardiovascular: Negative.    Gastrointestinal: Negative.     Genitourinary: Negative.    Musculoskeletal:        Left knee pain   Skin: Negative.    Neurological: Negative.    Psychiatric/Behavioral: Negative.        Objective   Physical Exam  Constitutional:       Appearance: Normal appearance.   HENT:      Head: Normocephalic.   Cardiovascular:      Rate and Rhythm: Normal rate and regular rhythm.      Pulses: Normal pulses.      Heart sounds: Normal heart sounds.   Pulmonary:      Effort: Pulmonary effort is normal.      Breath sounds: Normal breath sounds.   Abdominal:      General: Bowel sounds are normal.   Musculoskeletal:      Comments: Left knee slightly puffy and painful with weightbearing   Skin:     General: Skin is warm and dry.   Neurological:      General: No focal deficit present.      Mental Status: He is alert and oriented to person, place, and time.   Psychiatric:         Mood and Affect: Mood normal.         Behavior: Behavior normal.         Procedures    Assessment & Plan   Diagnoses and all orders for this visit:    1. Elevated glucose (Primary)  -     Comprehensive Metabolic Panel; Future  -     Hemoglobin A1c; Future    2. Screening PSA (prostate specific antigen)  -     PSA Screen; Future    3. Elevated LDL cholesterol level  -     Lipid Panel With LDL / HDL Ratio; Future    4. Acute pain of left knee  -     XR Knee 1 or 2 View Left          Current Outpatient Medications:   •  amLODIPine (NORVASC) 5 MG tablet, TAKE 1 TABLET BY MOUTH EVERY DAY AT BEDTIME, Disp: 30 tablet, Rfl: 0  •  sildenafil (VIAGRA) 100 MG tablet, TAKE 1 TABLET BY MOUTH ONCE DAILY AS NEEDED FOR ERECTILE DYSFUNCTION, Disp: 10 tablet, Rfl: 0  •  tamsulosin (FLOMAX) 0.4 MG capsule 24 hr capsule, Take 1 capsule by mouth Daily., Disp: , Rfl:            BINH Todd 5/18/2022 12:04 EDT  This note has been electronically signed

## 2022-05-18 NOTE — PATIENT INSTRUCTIONS
Left knee x-ray  Ibuprofen and ice at home  Wear knee brace  If no improvement within 2 weeks call office for orthopedic referral  Fasting blood work follow-up 6 month + way

## 2022-05-25 RX ORDER — SILDENAFIL 100 MG/1
TABLET, FILM COATED ORAL
Qty: 10 TABLET | Refills: 0 | Status: SHIPPED | OUTPATIENT
Start: 2022-05-25 | End: 2022-12-01

## 2022-05-25 RX ORDER — AMLODIPINE BESYLATE 5 MG/1
TABLET ORAL
Qty: 90 TABLET | Refills: 1 | Status: SHIPPED | OUTPATIENT
Start: 2022-05-25 | End: 2023-01-30

## 2022-05-25 NOTE — TELEPHONE ENCOUNTER
Caller: Gomez Mercedes    Relationship: Self    Best call back number: [phone]    Requested Prescriptions:   Requested Prescriptions     Pending Prescriptions Disp Refills   • amLODIPine (NORVASC) 5 MG tablet [Pharmacy Med Name: amLODIPine Besylate 5 MG Oral Tablet] 90 tablet 1     Sig: TAKE 1 TABLET BY MOUTH ONCE DAILY AT BEDTIME   • sildenafil (VIAGRA) 100 MG tablet [Pharmacy Med Name: Sildenafil Citrate 100 MG Oral Tablet] 10 tablet 0     Sig: TAKE 1 TABLET BY MOUTH ONCE DAILY AS NEEDED FOR ERECTILE DYSFUNCTION        Pharmacy where request should be sent: [address] [phone] [facility] [phone] [facility]     Additional details provided by patient: PATIENT HAS 4 DAYS LEFT    Does the patient have less than a 3 day supply:  [] Yes  [x] No    Breezy Mauro Rep   05/25/22 11:40 EDT

## 2022-07-13 ENCOUNTER — LAB (OUTPATIENT)
Dept: LAB | Facility: HOSPITAL | Age: 57
End: 2022-07-13

## 2022-07-13 PROCEDURE — 80061 LIPID PANEL: CPT | Performed by: NURSE PRACTITIONER

## 2022-07-13 PROCEDURE — 83036 HEMOGLOBIN GLYCOSYLATED A1C: CPT | Performed by: NURSE PRACTITIONER

## 2022-07-13 PROCEDURE — G0103 PSA SCREENING: HCPCS | Performed by: NURSE PRACTITIONER

## 2022-07-13 PROCEDURE — 80053 COMPREHEN METABOLIC PANEL: CPT | Performed by: NURSE PRACTITIONER

## 2022-07-14 ENCOUNTER — TELEPHONE (OUTPATIENT)
Dept: FAMILY MEDICINE CLINIC | Facility: CLINIC | Age: 57
End: 2022-07-14

## 2022-07-14 NOTE — TELEPHONE ENCOUNTER
Caller: Gomez Mercedes    Relationship: Self    Best call back number: [phone]    Who are you requesting to speak with (clinical staff, provider,  specific staff member): DR FOSTER OR MA    What was the call regarding: PATIENT STATES HIS WIFE RECENTLY TESTED POSITIVE FOR COVID. HE HAS BEEN EXPERIENCING A HEADACHE, AND SLIGHT SORE THROAT FOR THE LAST FEW DAYS. REQUESTS A CALL BACK TO DISCUSS FURTHER CONCERNS, AND PROCEDURE    Do you require a callback: YES

## 2022-07-14 NOTE — TELEPHONE ENCOUNTER
Spoke with pt.  He is going to  an over the counter test.  If he needs a test from us for work he will call and schedule an appt.  SG

## 2022-12-01 RX ORDER — SILDENAFIL 100 MG/1
TABLET, FILM COATED ORAL
Qty: 10 TABLET | Refills: 0 | Status: SHIPPED | OUTPATIENT
Start: 2022-12-01 | End: 2023-01-31

## 2023-01-23 RX ORDER — SILDENAFIL 100 MG/1
TABLET, FILM COATED ORAL
Qty: 10 TABLET | Refills: 0 | OUTPATIENT
Start: 2023-01-23

## 2023-01-30 ENCOUNTER — OFFICE VISIT (OUTPATIENT)
Dept: FAMILY MEDICINE CLINIC | Facility: CLINIC | Age: 58
End: 2023-01-30
Payer: COMMERCIAL

## 2023-01-30 VITALS
DIASTOLIC BLOOD PRESSURE: 78 MMHG | TEMPERATURE: 97.5 F | WEIGHT: 193 LBS | HEIGHT: 72 IN | OXYGEN SATURATION: 99 % | SYSTOLIC BLOOD PRESSURE: 128 MMHG | BODY MASS INDEX: 26.14 KG/M2 | HEART RATE: 73 BPM

## 2023-01-30 DIAGNOSIS — E66.3 OVERWEIGHT WITH BODY MASS INDEX (BMI) OF 26 TO 26.9 IN ADULT: ICD-10-CM

## 2023-01-30 DIAGNOSIS — E78.00 ELEVATED LDL CHOLESTEROL LEVEL: Primary | ICD-10-CM

## 2023-01-30 DIAGNOSIS — Z00.00 PREVENTATIVE HEALTH CARE: ICD-10-CM

## 2023-01-30 DIAGNOSIS — I10 ESSENTIAL HYPERTENSION: ICD-10-CM

## 2023-01-30 DIAGNOSIS — R73.09 ELEVATED HEMOGLOBIN A1C: ICD-10-CM

## 2023-01-30 PROCEDURE — 99213 OFFICE O/P EST LOW 20 MIN: CPT | Performed by: NURSE PRACTITIONER

## 2023-01-30 RX ORDER — AMLODIPINE BESYLATE 2.5 MG/1
2.5 TABLET ORAL DAILY
Qty: 60 TABLET | Refills: 2
Start: 2023-01-30

## 2023-01-30 NOTE — PROGRESS NOTES
"    Gomez Mercedes is a 57 y.o. male.     History of Present Illness  57-year-old white male with GERD, hypertension and mild BPH who comes in today for 6-month follow-up visit    Blood pressure 128/78 heart rate 72 he denies any chest pain, dyspnea, tachycardia or dizziness.  Patient states the amlodipine made him very sleepy and we are going to reduce it to 2.5 mg just to bring the diastolic down a little bit and take it at bedtime so drowsiness does not affect any of his activities    Patient has been dieting has lost 16 pounds with a weight of 193 and a BMI of 26.2.  His last A1c was 5.7  and I expect those to have returned to normal.    Patient is up-to-date on 3 COVID vaccines, up-to-date on PSA and his next colonoscopy is not due till 2031.  He does need to schedule an eye exam              Fasting blood work  Continue diet and exercise  Schedule eye exam  Follow-up 6 months  Hypertension  The current episode started more than 1 year ago. The problem is unchanged. The problem is controlled.        The following portions of the patient's history were reviewed and updated as appropriate: allergies, current medications, past family history, past medical history, past social history, past surgical history and problem list.    Vitals:    01/30/23 0822   BP: 128/78   BP Location: Right arm   Patient Position: Sitting   Cuff Size: Large Adult   Pulse: 73   Temp: 97.5 °F (36.4 °C)   TempSrc: Temporal   SpO2: 99%   Weight: 87.5 kg (193 lb)   Height: 182.9 cm (72\")     Body mass index is 26.18 kg/m².    Past Medical History:   Diagnosis Date   • Hypertension      Past Surgical History:   Procedure Laterality Date   • VASECTOMY       Family History   Problem Relation Age of Onset   • Diabetes Father      Immunization History   Administered Date(s) Administered   • COVID-19 (PFIZER) PURPLE CAP 03/17/2021, 04/07/2021, 10/09/2021       Results Encounter on 05/23/2022   Component Date Value Ref Range Status   • " Glucose 07/13/2022 83  65 - 99 mg/dL Final   • BUN 07/13/2022 20  6 - 20 mg/dL Final   • Creatinine 07/13/2022 1.12  0.76 - 1.27 mg/dL Final   • Sodium 07/13/2022 137  136 - 145 mmol/L Final   • Potassium 07/13/2022 3.9  3.5 - 5.2 mmol/L Final   • Chloride 07/13/2022 99  98 - 107 mmol/L Final   • CO2 07/13/2022 25.0  22.0 - 29.0 mmol/L Final   • Calcium 07/13/2022 9.6  8.6 - 10.5 mg/dL Final   • Total Protein 07/13/2022 7.1  6.0 - 8.5 g/dL Final   • Albumin 07/13/2022 5.10  3.50 - 5.20 g/dL Final   • ALT (SGPT) 07/13/2022 23  1 - 41 U/L Final   • AST (SGOT) 07/13/2022 19  1 - 40 U/L Final   • Alkaline Phosphatase 07/13/2022 62  39 - 117 U/L Final   • Total Bilirubin 07/13/2022 0.7  0.0 - 1.2 mg/dL Final   • Globulin 07/13/2022 2.0  gm/dL Final   • A/G Ratio 07/13/2022 2.6  g/dL Final   • BUN/Creatinine Ratio 07/13/2022 17.9  7.0 - 25.0 Final   • Anion Gap 07/13/2022 13.0  5.0 - 15.0 mmol/L Final   • eGFR 07/13/2022 78.1  >60.0 mL/min/1.73 Final    National Kidney Foundation and American Society of Nephrology (ASN) Task Force recommended calculation based on the Chronic Kidney Disease Epidemiology Collaboration (CKD-EPI) equation refit without adjustment for race.   • Total Cholesterol 07/13/2022 196  0 - 200 mg/dL Final   • Triglycerides 07/13/2022 101  0 - 150 mg/dL Final   • HDL Cholesterol 07/13/2022 51  40 - 60 mg/dL Final   • LDL Cholesterol  07/13/2022 127 (H)  0 - 100 mg/dL Final   • VLDL Cholesterol 07/13/2022 18  5 - 40 mg/dL Final   • LDL/HDL Ratio 07/13/2022 2.45   Final   • PSA 07/13/2022 1.010  0.000 - 4.000 ng/mL Final   • Hemoglobin A1C 07/13/2022 5.70 (H)  4.80 - 5.60 % Final         Review of Systems   Constitutional: Negative.    HENT: Negative.    Respiratory: Negative.    Cardiovascular: Negative.    Gastrointestinal: Negative.    Genitourinary: Negative.    Musculoskeletal: Negative.    Skin: Negative.    Neurological: Negative.    Psychiatric/Behavioral: Negative.        Objective   Physical  Exam  Constitutional:       Appearance: Normal appearance.   HENT:      Head: Normocephalic.   Cardiovascular:      Rate and Rhythm: Normal rate and regular rhythm.      Pulses: Normal pulses.      Heart sounds: Normal heart sounds.   Pulmonary:      Effort: Pulmonary effort is normal.      Breath sounds: Normal breath sounds.   Abdominal:      General: Bowel sounds are normal.   Musculoskeletal:         General: Normal range of motion.   Skin:     General: Skin is warm.   Neurological:      General: No focal deficit present.      Mental Status: He is alert and oriented to person, place, and time.   Psychiatric:         Mood and Affect: Mood normal.         Behavior: Behavior normal.         Procedures    Assessment & Plan   Diagnoses and all orders for this visit:    1. Elevated LDL cholesterol level (Primary)  -     Lipid Panel With LDL / HDL Ratio    2. Elevated hemoglobin A1c  -     Comprehensive Metabolic Panel  -     Hemoglobin A1c    3. Preventative health care  -     CBC & Differential    4. Overweight with body mass index (BMI) of 26 to 26.9 in adult    5. Essential hypertension    Other orders  -     amLODIPine (Norvasc) 2.5 MG tablet; Take 1 tablet by mouth Daily.  Dispense: 60 tablet; Refill: 2          Current Outpatient Medications:   •  sildenafil (VIAGRA) 100 MG tablet, TAKE 1 TABLET BY MOUTH ONCE DAILY AS NEEDED FOR ERECTILE DYSFUNCTION, Disp: 10 tablet, Rfl: 0  •  amLODIPine (Norvasc) 2.5 MG tablet, Take 1 tablet by mouth Daily., Disp: 60 tablet, Rfl: 2           BINH Todd 1/30/2023 08:59 EST  This note has been electronically signed

## 2023-01-31 LAB
ALBUMIN SERPL-MCNC: 4.8 G/DL (ref 3.8–4.9)
ALBUMIN/GLOB SERPL: 2.2 {RATIO} (ref 1.2–2.2)
ALP SERPL-CCNC: 68 IU/L (ref 44–121)
ALT SERPL-CCNC: 20 IU/L (ref 0–44)
AST SERPL-CCNC: 18 IU/L (ref 0–40)
BASOPHILS # BLD AUTO: 0 X10E3/UL (ref 0–0.2)
BASOPHILS NFR BLD AUTO: 1 %
BILIRUB SERPL-MCNC: 0.3 MG/DL (ref 0–1.2)
BUN SERPL-MCNC: 34 MG/DL (ref 6–24)
BUN/CREAT SERPL: 33 (ref 9–20)
CALCIUM SERPL-MCNC: 9.6 MG/DL (ref 8.7–10.2)
CHLORIDE SERPL-SCNC: 103 MMOL/L (ref 96–106)
CHOLEST SERPL-MCNC: 151 MG/DL (ref 100–199)
CO2 SERPL-SCNC: 25 MMOL/L (ref 20–29)
CREAT SERPL-MCNC: 1.03 MG/DL (ref 0.76–1.27)
EGFRCR SERPLBLD CKD-EPI 2021: 86 ML/MIN/1.73
EOSINOPHIL # BLD AUTO: 0.1 X10E3/UL (ref 0–0.4)
EOSINOPHIL NFR BLD AUTO: 1 %
ERYTHROCYTE [DISTWIDTH] IN BLOOD BY AUTOMATED COUNT: 13.1 % (ref 11.6–15.4)
GLOBULIN SER CALC-MCNC: 2.2 G/DL (ref 1.5–4.5)
GLUCOSE SERPL-MCNC: 100 MG/DL (ref 70–99)
HBA1C MFR BLD: 5.7 % (ref 4.8–5.6)
HCT VFR BLD AUTO: 42 % (ref 37.5–51)
HDLC SERPL-MCNC: 48 MG/DL
HGB BLD-MCNC: 13.9 G/DL (ref 13–17.7)
IMM GRANULOCYTES # BLD AUTO: 0 X10E3/UL (ref 0–0.1)
IMM GRANULOCYTES NFR BLD AUTO: 0 %
LDLC SERPL CALC-MCNC: 86 MG/DL (ref 0–99)
LDLC/HDLC SERPL: 1.8 RATIO (ref 0–3.6)
LYMPHOCYTES # BLD AUTO: 2.9 X10E3/UL (ref 0.7–3.1)
LYMPHOCYTES NFR BLD AUTO: 46 %
MCH RBC QN AUTO: 30.5 PG (ref 26.6–33)
MCHC RBC AUTO-ENTMCNC: 33.1 G/DL (ref 31.5–35.7)
MCV RBC AUTO: 92 FL (ref 79–97)
MONOCYTES # BLD AUTO: 0.5 X10E3/UL (ref 0.1–0.9)
MONOCYTES NFR BLD AUTO: 8 %
NEUTROPHILS # BLD AUTO: 2.7 X10E3/UL (ref 1.4–7)
NEUTROPHILS NFR BLD AUTO: 44 %
PLATELET # BLD AUTO: 266 X10E3/UL (ref 150–450)
POTASSIUM SERPL-SCNC: 4.7 MMOL/L (ref 3.5–5.2)
PROT SERPL-MCNC: 7 G/DL (ref 6–8.5)
RBC # BLD AUTO: 4.55 X10E6/UL (ref 4.14–5.8)
SODIUM SERPL-SCNC: 140 MMOL/L (ref 134–144)
TRIGL SERPL-MCNC: 91 MG/DL (ref 0–149)
VLDLC SERPL CALC-MCNC: 17 MG/DL (ref 5–40)
WBC # BLD AUTO: 6.2 X10E3/UL (ref 3.4–10.8)

## 2023-01-31 RX ORDER — SILDENAFIL 100 MG/1
TABLET, FILM COATED ORAL
Qty: 10 TABLET | Refills: 0 | Status: SHIPPED | OUTPATIENT
Start: 2023-01-31

## 2023-07-24 ENCOUNTER — OFFICE VISIT (OUTPATIENT)
Dept: FAMILY MEDICINE CLINIC | Facility: CLINIC | Age: 58
End: 2023-07-24
Payer: COMMERCIAL

## 2023-07-24 VITALS
TEMPERATURE: 97.3 F | OXYGEN SATURATION: 98 % | HEIGHT: 72 IN | SYSTOLIC BLOOD PRESSURE: 120 MMHG | DIASTOLIC BLOOD PRESSURE: 82 MMHG | WEIGHT: 198.4 LBS | HEART RATE: 65 BPM | BODY MASS INDEX: 26.87 KG/M2

## 2023-07-24 DIAGNOSIS — Z00.00 PREVENTATIVE HEALTH CARE: Primary | ICD-10-CM

## 2023-07-24 DIAGNOSIS — Z13.220 LIPID SCREENING: ICD-10-CM

## 2023-07-24 DIAGNOSIS — R73.09 ELEVATED HEMOGLOBIN A1C: ICD-10-CM

## 2023-07-24 DIAGNOSIS — Z12.5 SCREENING PSA (PROSTATE SPECIFIC ANTIGEN): ICD-10-CM

## 2023-07-24 PROCEDURE — 99213 OFFICE O/P EST LOW 20 MIN: CPT | Performed by: NURSE PRACTITIONER

## 2023-07-24 NOTE — PROGRESS NOTES
"Gomez Mercedes is a 57 y.o. male.     History of Present Illness  57-year-old white male with GERD, hypertension and mild BPH who comes in today for 6-month follow-up visit and fasting blood work    Blood pressure 120/82 heart rate 64 he denies any chest pain, dyspnea, tachycardia or dizziness    Patient has Peyronie's disease and did go to urology was placed on Cialis 20 mg which seems to be helping and is also doing Kegel exercises.  His disease is not severe enough at this point to do anything else    Weight is up 5 pounds at 198 for BMI of 26.9.  He has had 3 COVID vaccines up-to-date on eye exam we will be doing a PSA today and his next colonoscopy is due in 2031            Fasting blood work  Keep all appointments with urology  Monitor weight  Follow-up 6 months     The following portions of the patient's history were reviewed and updated as appropriate: allergies, current medications, past family history, past medical history, past social history, past surgical history, and problem list.    Vitals:    07/24/23 0808   BP: 120/82   BP Location: Right arm   Patient Position: Sitting   Cuff Size: Adult   Pulse: 65   Temp: 97.3 °F (36.3 °C)   TempSrc: Infrared   SpO2: 98%   Weight: 90 kg (198 lb 6.4 oz)   Height: 182.9 cm (72.01\")     Body mass index is 26.9 kg/m².    Past Medical History:   Diagnosis Date    Hypertension      Past Surgical History:   Procedure Laterality Date    VASECTOMY       Family History   Problem Relation Age of Onset    Diabetes Father      Immunization History   Administered Date(s) Administered    COVID-19 (PFIZER) Purple Cap Monovalent 03/17/2021, 04/07/2021, 10/09/2021       Office Visit on 01/30/2023   Component Date Value Ref Range Status    WBC 01/30/2023 6.2  3.4 - 10.8 x10E3/uL Final    RBC 01/30/2023 4.55  4.14 - 5.80 x10E6/uL Final    Hemoglobin 01/30/2023 13.9  13.0 - 17.7 g/dL Final    Hematocrit 01/30/2023 42.0  37.5 - 51.0 % Final    MCV 01/30/2023 92  79 - 97 fL " Final    MCH 01/30/2023 30.5  26.6 - 33.0 pg Final    MCHC 01/30/2023 33.1  31.5 - 35.7 g/dL Final    RDW 01/30/2023 13.1  11.6 - 15.4 % Final    Platelets 01/30/2023 266  150 - 450 x10E3/uL Final    Neutrophil Rel % 01/30/2023 44  Not Estab. % Final    Lymphocyte Rel % 01/30/2023 46  Not Estab. % Final    Monocyte Rel % 01/30/2023 8  Not Estab. % Final    Eosinophil Rel % 01/30/2023 1  Not Estab. % Final    Basophil Rel % 01/30/2023 1  Not Estab. % Final    Neutrophils Absolute 01/30/2023 2.7  1.4 - 7.0 x10E3/uL Final    Lymphocytes Absolute 01/30/2023 2.9  0.7 - 3.1 x10E3/uL Final    Monocytes Absolute 01/30/2023 0.5  0.1 - 0.9 x10E3/uL Final    Eosinophils Absolute 01/30/2023 0.1  0.0 - 0.4 x10E3/uL Final    Basophils Absolute 01/30/2023 0.0  0.0 - 0.2 x10E3/uL Final    Immature Granulocyte Rel % 01/30/2023 0  Not Estab. % Final    Immature Grans Absolute 01/30/2023 0.0  0.0 - 0.1 x10E3/uL Final    Glucose 01/30/2023 100 (H)  70 - 99 mg/dL Final    BUN 01/30/2023 34 (H)  6 - 24 mg/dL Final    Creatinine 01/30/2023 1.03  0.76 - 1.27 mg/dL Final    EGFR Result 01/30/2023 86  >59 mL/min/1.73 Final    BUN/Creatinine Ratio 01/30/2023 33 (H)  9 - 20 Final    Sodium 01/30/2023 140  134 - 144 mmol/L Final    Potassium 01/30/2023 4.7  3.5 - 5.2 mmol/L Final    Chloride 01/30/2023 103  96 - 106 mmol/L Final    Total CO2 01/30/2023 25  20 - 29 mmol/L Final    Calcium 01/30/2023 9.6  8.7 - 10.2 mg/dL Final    Total Protein 01/30/2023 7.0  6.0 - 8.5 g/dL Final    Albumin 01/30/2023 4.8  3.8 - 4.9 g/dL Final    Globulin 01/30/2023 2.2  1.5 - 4.5 g/dL Final    A/G Ratio 01/30/2023 2.2  1.2 - 2.2 Final    Total Bilirubin 01/30/2023 0.3  0.0 - 1.2 mg/dL Final    Alkaline Phosphatase 01/30/2023 68  44 - 121 IU/L Final    AST (SGOT) 01/30/2023 18  0 - 40 IU/L Final    ALT (SGPT) 01/30/2023 20  0 - 44 IU/L Final    Total Cholesterol 01/30/2023 151  100 - 199 mg/dL Final    Triglycerides 01/30/2023 91  0 - 149 mg/dL Final    HDL  Cholesterol 01/30/2023 48  >39 mg/dL Final    VLDL Cholesterol [place] 01/30/2023 17  5 - 40 mg/dL Final    LDL Chol Calc (NIH) 01/30/2023 86  0 - 99 mg/dL Final    LDL/HDL RATIO 01/30/2023 1.8  0.0 - 3.6 ratio Final    Comment:                                     LDL/HDL Ratio                                              Men  Women                                1/2 Avg.Risk  1.0    1.5                                    Avg.Risk  3.6    3.2                                 2X Avg.Risk  6.2    5.0                                 3X Avg.Risk  8.0    6.1      Hemoglobin A1C 01/30/2023 5.7 (H)  4.8 - 5.6 % Final    Comment:          Prediabetes: 5.7 - 6.4           Diabetes: >6.4           Glycemic control for adults with diabetes: <7.0           Review of Systems   Constitutional: Negative.    HENT: Negative.     Respiratory: Negative.     Cardiovascular: Negative.    Gastrointestinal: Negative.    Genitourinary: Negative.    Musculoskeletal: Negative.    Skin: Negative.    Neurological: Negative.    Psychiatric/Behavioral: Negative.       Objective   Physical Exam  Constitutional:       Appearance: Normal appearance.   HENT:      Head: Normocephalic.   Cardiovascular:      Rate and Rhythm: Normal rate and regular rhythm.      Pulses: Normal pulses.      Heart sounds: Normal heart sounds.   Pulmonary:      Effort: Pulmonary effort is normal.      Breath sounds: Normal breath sounds.   Abdominal:      General: Bowel sounds are normal.   Musculoskeletal:         General: Normal range of motion.   Skin:     General: Skin is warm and dry.   Neurological:      General: No focal deficit present.      Mental Status: He is alert and oriented to person, place, and time.   Psychiatric:         Mood and Affect: Mood normal.         Behavior: Behavior normal.       Procedures    Assessment & Plan Diagnoses and all orders for this visit:    1. Preventative health care (Primary)  -     CBC & Differential    2. Screening PSA (prostate  specific antigen)  -     PSA Screen    3. Lipid screening  -     Lipid Panel With LDL / HDL Ratio    4. Elevated hemoglobin A1c  -     Comprehensive Metabolic Panel  -     Hemoglobin A1c          No current outpatient medications on file.           Nicki Gallwoay, APRN 7/24/2023 08:31 EDT  This note has been electronically signed

## 2023-07-25 LAB
ALBUMIN SERPL-MCNC: 4.8 G/DL (ref 3.8–4.9)
ALBUMIN/GLOB SERPL: 2.3 {RATIO} (ref 1.2–2.2)
ALP SERPL-CCNC: 66 IU/L (ref 44–121)
ALT SERPL-CCNC: 20 IU/L (ref 0–44)
AST SERPL-CCNC: 18 IU/L (ref 0–40)
BASOPHILS # BLD AUTO: 0 X10E3/UL (ref 0–0.2)
BASOPHILS NFR BLD AUTO: 0 %
BILIRUB SERPL-MCNC: 0.4 MG/DL (ref 0–1.2)
BUN SERPL-MCNC: 22 MG/DL (ref 6–24)
BUN/CREAT SERPL: 21 (ref 9–20)
CALCIUM SERPL-MCNC: 9.3 MG/DL (ref 8.7–10.2)
CHLORIDE SERPL-SCNC: 107 MMOL/L (ref 96–106)
CHOLEST SERPL-MCNC: 167 MG/DL (ref 100–199)
CO2 SERPL-SCNC: 24 MMOL/L (ref 20–29)
CREAT SERPL-MCNC: 1.03 MG/DL (ref 0.76–1.27)
EGFRCR SERPLBLD CKD-EPI 2021: 86 ML/MIN/1.73
EOSINOPHIL # BLD AUTO: 0.1 X10E3/UL (ref 0–0.4)
EOSINOPHIL NFR BLD AUTO: 2 %
ERYTHROCYTE [DISTWIDTH] IN BLOOD BY AUTOMATED COUNT: 13.6 % (ref 11.6–15.4)
GLOBULIN SER CALC-MCNC: 2.1 G/DL (ref 1.5–4.5)
GLUCOSE SERPL-MCNC: 110 MG/DL (ref 70–99)
HBA1C MFR BLD: 5.8 % (ref 4.8–5.6)
HCT VFR BLD AUTO: 43.3 % (ref 37.5–51)
HDLC SERPL-MCNC: 55 MG/DL
HGB BLD-MCNC: 14.3 G/DL (ref 13–17.7)
IMM GRANULOCYTES # BLD AUTO: 0 X10E3/UL (ref 0–0.1)
IMM GRANULOCYTES NFR BLD AUTO: 0 %
LDLC SERPL CALC-MCNC: 93 MG/DL (ref 0–99)
LDLC/HDLC SERPL: 1.7 RATIO (ref 0–3.6)
LYMPHOCYTES # BLD AUTO: 2.9 X10E3/UL (ref 0.7–3.1)
LYMPHOCYTES NFR BLD AUTO: 51 %
MCH RBC QN AUTO: 30.9 PG (ref 26.6–33)
MCHC RBC AUTO-ENTMCNC: 33 G/DL (ref 31.5–35.7)
MCV RBC AUTO: 94 FL (ref 79–97)
MONOCYTES # BLD AUTO: 0.3 X10E3/UL (ref 0.1–0.9)
MONOCYTES NFR BLD AUTO: 6 %
NEUTROPHILS # BLD AUTO: 2.4 X10E3/UL (ref 1.4–7)
NEUTROPHILS NFR BLD AUTO: 41 %
PLATELET # BLD AUTO: 233 X10E3/UL (ref 150–450)
POTASSIUM SERPL-SCNC: 5.2 MMOL/L (ref 3.5–5.2)
PROT SERPL-MCNC: 6.9 G/DL (ref 6–8.5)
PSA SERPL-MCNC: 1.6 NG/ML (ref 0–4)
RBC # BLD AUTO: 4.63 X10E6/UL (ref 4.14–5.8)
SODIUM SERPL-SCNC: 142 MMOL/L (ref 134–144)
TRIGL SERPL-MCNC: 106 MG/DL (ref 0–149)
VLDLC SERPL CALC-MCNC: 19 MG/DL (ref 5–40)
WBC # BLD AUTO: 5.8 X10E3/UL (ref 3.4–10.8)

## 2024-01-29 ENCOUNTER — OFFICE VISIT (OUTPATIENT)
Dept: FAMILY MEDICINE CLINIC | Facility: CLINIC | Age: 59
End: 2024-01-29
Payer: COMMERCIAL

## 2024-01-29 VITALS
TEMPERATURE: 97.1 F | HEIGHT: 72 IN | WEIGHT: 203 LBS | HEART RATE: 75 BPM | BODY MASS INDEX: 27.5 KG/M2 | OXYGEN SATURATION: 96 % | SYSTOLIC BLOOD PRESSURE: 132 MMHG | DIASTOLIC BLOOD PRESSURE: 82 MMHG

## 2024-01-29 DIAGNOSIS — Z13.220 LIPID SCREENING: ICD-10-CM

## 2024-01-29 DIAGNOSIS — Z00.00 PREVENTATIVE HEALTH CARE: Primary | ICD-10-CM

## 2024-01-29 DIAGNOSIS — R73.09 ELEVATED HEMOGLOBIN A1C: ICD-10-CM

## 2024-01-29 DIAGNOSIS — E66.3 OVERWEIGHT WITH BODY MASS INDEX (BMI) OF 27 TO 27.9 IN ADULT: ICD-10-CM

## 2024-01-29 PROCEDURE — 99213 OFFICE O/P EST LOW 20 MIN: CPT | Performed by: NURSE PRACTITIONER

## 2024-01-29 NOTE — PROGRESS NOTES
"    Gomez Mercedes is a 58 y.o. male.     History of Present Illness  58-year-old white male with GERD, hypertension and mild BPH who comes in today for 6-month follow-up visit and fasting blood work    Blood pressure 132/82 heart rate 74 he denies any chest pain, dyspnea, tachycardia or dizziness    Patient's last A1c was mildly elevated at 5.8, blood sugar 110 with a normal lipid panel.  Will be doing fasting labs today    He takes no medications and has no new complaints.  Weight is up 5 pounds at 203 with a BMI 27.5.  He has had 3 COVID vaccines up-to-date on eye exam we will be drawing a PSA today and his next colonoscopy is due in 2031          Fasting blood work  Diet and exercise  Follow-up 6 months       The following portions of the patient's history were reviewed and updated as appropriate: allergies, current medications, past family history, past medical history, past social history, past surgical history, and problem list.    Vitals:    01/29/24 0823   BP: 132/82   BP Location: Right arm   Patient Position: Sitting   Cuff Size: Adult   Pulse: 75   Temp: 97.1 °F (36.2 °C)   TempSrc: Infrared   SpO2: 96%   Weight: 92.1 kg (203 lb)   Height: 182.9 cm (72.01\")     Body mass index is 27.53 kg/m².    Past Medical History:   Diagnosis Date    Hypertension      Past Surgical History:   Procedure Laterality Date    VASECTOMY       Family History   Problem Relation Age of Onset    Diabetes Father      Immunization History   Administered Date(s) Administered    COVID-19 (PFIZER) Purple Cap Monovalent 03/17/2021, 04/07/2021, 10/09/2021       Office Visit on 07/24/2023   Component Date Value Ref Range Status    WBC 07/24/2023 5.8  3.4 - 10.8 x10E3/uL Final    RBC 07/24/2023 4.63  4.14 - 5.80 x10E6/uL Final    Hemoglobin 07/24/2023 14.3  13.0 - 17.7 g/dL Final    Hematocrit 07/24/2023 43.3  37.5 - 51.0 % Final    MCV 07/24/2023 94  79 - 97 fL Final    MCH 07/24/2023 30.9  26.6 - 33.0 pg Final    MCHC 07/24/2023 " 33.0  31.5 - 35.7 g/dL Final    RDW 07/24/2023 13.6  11.6 - 15.4 % Final    Platelets 07/24/2023 233  150 - 450 x10E3/uL Final    Neutrophil Rel % 07/24/2023 41  Not Estab. % Final    Lymphocyte Rel % 07/24/2023 51  Not Estab. % Final    Monocyte Rel % 07/24/2023 6  Not Estab. % Final    Eosinophil Rel % 07/24/2023 2  Not Estab. % Final    Basophil Rel % 07/24/2023 0  Not Estab. % Final    Neutrophils Absolute 07/24/2023 2.4  1.4 - 7.0 x10E3/uL Final    Lymphocytes Absolute 07/24/2023 2.9  0.7 - 3.1 x10E3/uL Final    Monocytes Absolute 07/24/2023 0.3  0.1 - 0.9 x10E3/uL Final    Eosinophils Absolute 07/24/2023 0.1  0.0 - 0.4 x10E3/uL Final    Basophils Absolute 07/24/2023 0.0  0.0 - 0.2 x10E3/uL Final    Immature Granulocyte Rel % 07/24/2023 0  Not Estab. % Final    Immature Grans Absolute 07/24/2023 0.0  0.0 - 0.1 x10E3/uL Final    Glucose 07/24/2023 110 (H)  70 - 99 mg/dL Final    BUN 07/24/2023 22  6 - 24 mg/dL Final    Creatinine 07/24/2023 1.03  0.76 - 1.27 mg/dL Final    EGFR Result 07/24/2023 86  >59 mL/min/1.73 Final    BUN/Creatinine Ratio 07/24/2023 21 (H)  9 - 20 Final    Sodium 07/24/2023 142  134 - 144 mmol/L Final    Potassium 07/24/2023 5.2  3.5 - 5.2 mmol/L Final    Chloride 07/24/2023 107 (H)  96 - 106 mmol/L Final    Total CO2 07/24/2023 24  20 - 29 mmol/L Final    Calcium 07/24/2023 9.3  8.7 - 10.2 mg/dL Final    Total Protein 07/24/2023 6.9  6.0 - 8.5 g/dL Final    Albumin 07/24/2023 4.8  3.8 - 4.9 g/dL Final                  **Please note reference interval change**    Globulin 07/24/2023 2.1  1.5 - 4.5 g/dL Final    A/G Ratio 07/24/2023 2.3 (H)  1.2 - 2.2 Final    Total Bilirubin 07/24/2023 0.4  0.0 - 1.2 mg/dL Final    Alkaline Phosphatase 07/24/2023 66  44 - 121 IU/L Final    AST (SGOT) 07/24/2023 18  0 - 40 IU/L Final    ALT (SGPT) 07/24/2023 20  0 - 44 IU/L Final    Hemoglobin A1C 07/24/2023 5.8 (H)  4.8 - 5.6 % Final    Comment:          Prediabetes: 5.7 - 6.4           Diabetes: >6.4            Glycemic control for adults with diabetes: <7.0      Total Cholesterol 07/24/2023 167  100 - 199 mg/dL Final    Triglycerides 07/24/2023 106  0 - 149 mg/dL Final    HDL Cholesterol 07/24/2023 55  >39 mg/dL Final    VLDL Cholesterol [place] 07/24/2023 19  5 - 40 mg/dL Final    LDL Chol Calc (NIH) 07/24/2023 93  0 - 99 mg/dL Final    LDL/HDL RATIO 07/24/2023 1.7  0.0 - 3.6 ratio Final    Comment:                                     LDL/HDL Ratio                                              Men  Women                                1/2 Avg.Risk  1.0    1.5                                    Avg.Risk  3.6    3.2                                 2X Avg.Risk  6.2    5.0                                 3X Avg.Risk  8.0    6.1      PSA 07/24/2023 1.6  0.0 - 4.0 ng/mL Final    Comment: Roche ECLIA methodology.  According to the [employer] Association, Serum PSA should  decrease and remain at undetectable levels after radical  prostatectomy. The AUA defines biochemical recurrence as an initial  PSA value 0.2 ng/mL or greater followed by a subsequent confirmatory  PSA value 0.2 ng/mL or greater.  Values obtained with different assay methods or kits cannot be used  interchangeably. Results cannot be interpreted as absolute evidence  of the presence or absence of malignant disease.           Review of Systems   Constitutional: Negative.    HENT: Negative.     Respiratory: Negative.     Cardiovascular: Negative.    Gastrointestinal: Negative.    Genitourinary: Negative.    Musculoskeletal: Negative.    Skin: Negative.    Neurological: Negative.    Psychiatric/Behavioral: Negative.         Objective   Physical Exam  Constitutional:       Appearance: Normal appearance.   HENT:      Head: Normocephalic.   Cardiovascular:      Rate and Rhythm: Normal rate and regular rhythm.      Pulses: Normal pulses.      Heart sounds: Normal heart sounds.   Pulmonary:      Effort: Pulmonary effort is normal.      Breath sounds: Normal  breath sounds.   Abdominal:      General: Bowel sounds are normal.   Musculoskeletal:         General: Normal range of motion.   Skin:     General: Skin is warm.   Neurological:      General: No focal deficit present.      Mental Status: He is alert and oriented to person, place, and time.   Psychiatric:         Mood and Affect: Mood normal.         Behavior: Behavior normal.         Procedures    Assessment & Plan   Diagnoses and all orders for this visit:    1. Preventative health care (Primary)  -     CBC & Differential    2. Lipid screening  -     Lipid Panel With LDL / HDL Ratio    3. Elevated hemoglobin A1c  -     Comprehensive Metabolic Panel  -     Hemoglobin A1c    4. Overweight with body mass index (BMI) of 27 to 27.9 in adult         No current outpatient medications on file.           Nicki Galloway, APRN 1/29/2024 08:42 EST  This note has been electronically signed

## 2024-01-30 LAB
ALBUMIN SERPL-MCNC: 5 G/DL (ref 3.8–4.9)
ALBUMIN/GLOB SERPL: 2.5 {RATIO} (ref 1.2–2.2)
ALP SERPL-CCNC: 62 IU/L (ref 44–121)
ALT SERPL-CCNC: 17 IU/L (ref 0–44)
AST SERPL-CCNC: 17 IU/L (ref 0–40)
BASOPHILS # BLD AUTO: 0 X10E3/UL (ref 0–0.2)
BASOPHILS NFR BLD AUTO: 1 %
BILIRUB SERPL-MCNC: <0.2 MG/DL (ref 0–1.2)
BUN SERPL-MCNC: 21 MG/DL (ref 6–24)
BUN/CREAT SERPL: 20 (ref 9–20)
CALCIUM SERPL-MCNC: 9.8 MG/DL (ref 8.7–10.2)
CHLORIDE SERPL-SCNC: 103 MMOL/L (ref 96–106)
CHOLEST SERPL-MCNC: 185 MG/DL (ref 100–199)
CO2 SERPL-SCNC: 23 MMOL/L (ref 20–29)
CREAT SERPL-MCNC: 1.06 MG/DL (ref 0.76–1.27)
EGFRCR SERPLBLD CKD-EPI 2021: 82 ML/MIN/1.73
EOSINOPHIL # BLD AUTO: 0.1 X10E3/UL (ref 0–0.4)
EOSINOPHIL NFR BLD AUTO: 2 %
ERYTHROCYTE [DISTWIDTH] IN BLOOD BY AUTOMATED COUNT: 12.5 % (ref 11.6–15.4)
GLOBULIN SER CALC-MCNC: 2 G/DL (ref 1.5–4.5)
GLUCOSE SERPL-MCNC: 107 MG/DL (ref 70–99)
HBA1C MFR BLD: 5.7 % (ref 4.8–5.6)
HCT VFR BLD AUTO: 42.8 % (ref 37.5–51)
HDLC SERPL-MCNC: 46 MG/DL
HGB BLD-MCNC: 14.6 G/DL (ref 13–17.7)
IMM GRANULOCYTES # BLD AUTO: 0 X10E3/UL (ref 0–0.1)
IMM GRANULOCYTES NFR BLD AUTO: 0 %
LDLC SERPL CALC-MCNC: 113 MG/DL (ref 0–99)
LDLC/HDLC SERPL: 2.5 RATIO (ref 0–3.6)
LYMPHOCYTES # BLD AUTO: 3.1 X10E3/UL (ref 0.7–3.1)
LYMPHOCYTES NFR BLD AUTO: 49 %
MCH RBC QN AUTO: 31.7 PG (ref 26.6–33)
MCHC RBC AUTO-ENTMCNC: 34.1 G/DL (ref 31.5–35.7)
MCV RBC AUTO: 93 FL (ref 79–97)
MONOCYTES # BLD AUTO: 0.4 X10E3/UL (ref 0.1–0.9)
MONOCYTES NFR BLD AUTO: 7 %
NEUTROPHILS # BLD AUTO: 2.6 X10E3/UL (ref 1.4–7)
NEUTROPHILS NFR BLD AUTO: 41 %
PLATELET # BLD AUTO: 271 X10E3/UL (ref 150–450)
POTASSIUM SERPL-SCNC: 5.2 MMOL/L (ref 3.5–5.2)
PROT SERPL-MCNC: 7 G/DL (ref 6–8.5)
RBC # BLD AUTO: 4.6 X10E6/UL (ref 4.14–5.8)
SODIUM SERPL-SCNC: 142 MMOL/L (ref 134–144)
TRIGL SERPL-MCNC: 144 MG/DL (ref 0–149)
VLDLC SERPL CALC-MCNC: 26 MG/DL (ref 5–40)
WBC # BLD AUTO: 6.3 X10E3/UL (ref 3.4–10.8)

## 2024-07-31 ENCOUNTER — OFFICE VISIT (OUTPATIENT)
Dept: FAMILY MEDICINE CLINIC | Facility: CLINIC | Age: 59
End: 2024-07-31
Payer: COMMERCIAL

## 2024-07-31 VITALS
SYSTOLIC BLOOD PRESSURE: 106 MMHG | TEMPERATURE: 98 F | HEART RATE: 76 BPM | DIASTOLIC BLOOD PRESSURE: 64 MMHG | BODY MASS INDEX: 27.5 KG/M2 | OXYGEN SATURATION: 96 % | WEIGHT: 203 LBS | HEIGHT: 72 IN

## 2024-07-31 DIAGNOSIS — E78.2 MIXED HYPERLIPIDEMIA: ICD-10-CM

## 2024-07-31 DIAGNOSIS — Z12.5 SCREENING PSA (PROSTATE SPECIFIC ANTIGEN): ICD-10-CM

## 2024-07-31 DIAGNOSIS — M54.42 ACUTE MIDLINE LOW BACK PAIN WITH BILATERAL SCIATICA: ICD-10-CM

## 2024-07-31 DIAGNOSIS — Z00.00 ENCOUNTER FOR PREVENTIVE CARE: ICD-10-CM

## 2024-07-31 DIAGNOSIS — R73.09 ELEVATED HEMOGLOBIN A1C: Primary | ICD-10-CM

## 2024-07-31 DIAGNOSIS — M79.659 PAIN OF LATERAL UPPER THIGH: ICD-10-CM

## 2024-07-31 DIAGNOSIS — E66.3 OVERWEIGHT WITH BODY MASS INDEX (BMI) OF 27 TO 27.9 IN ADULT: ICD-10-CM

## 2024-07-31 DIAGNOSIS — M54.41 ACUTE MIDLINE LOW BACK PAIN WITH BILATERAL SCIATICA: ICD-10-CM

## 2024-07-31 PROCEDURE — 99214 OFFICE O/P EST MOD 30 MIN: CPT | Performed by: NURSE PRACTITIONER

## 2024-07-31 RX ORDER — CALCIUM CARBONATE 300MG(750)
400 TABLET,CHEWABLE ORAL 2 TIMES DAILY
Qty: 60 TABLET | Refills: 3 | Status: SHIPPED | OUTPATIENT
Start: 2024-07-31

## 2024-07-31 RX ORDER — PREDNISONE 5 MG/1
TABLET ORAL
Qty: 20 TABLET | Refills: 0 | Status: SHIPPED | OUTPATIENT
Start: 2024-07-31 | End: 2024-08-08

## 2024-07-31 RX ORDER — GABAPENTIN 300 MG/1
300 CAPSULE ORAL 3 TIMES DAILY
Qty: 30 CAPSULE | Refills: 2 | Status: SHIPPED | OUTPATIENT
Start: 2024-07-31

## 2024-07-31 NOTE — PATIENT INSTRUCTIONS
Fasting blood work  Lumbar x-ray  Magnesium 40 mg twice daily  Prednisone 5 mg taper dose  Aleve to 20 mg twice a day while on steroids  Gabapentin 300 mg nightly while on steroids  Avoid lifting  Diet and exercise

## 2024-07-31 NOTE — PROGRESS NOTES
"    Gomez Mercedes is a 58 y.o. male.     History of Present Illness  58-year-old white male with GERD, hypertension and mild BPH who comes in today for 6-month follow-up visit fasting blood work    Blood pressure 106/64 heart rate 76 he denies any chest pain, dyspnea, tachycardia or dizziness    Patient's last blood work showed A1c 5.7 fasting blood sugar 107 and mildly elevated LDL will be doing fasting labs today    Patient with complaint is in the last couple weeks he has been having a lot of aching sensation in his upper thighs that is positional.  He states after times it will wake him up at night.  Will get an x-ray of his lumbar placing months medications and will follow-up from there I suspect he probably has lumbar disc disease    Weight is 203 with a BMI 27.5.  He is at 3 COVID vaccines up-to-date on eye exam PSA and his next colonoscopy is due in 2031            Fasting blood work  Lumbar x-ray  Magnesium 40 mg twice daily  Prednisone 5 mg taper dose  Aleve to 20 mg twice a day while on steroids  Gabapentin 300 mg nightly while on steroids  Avoid lifting  Diet and exercise       The following portions of the patient's history were reviewed and updated as appropriate: allergies, current medications, past family history, past medical history, past social history, past surgical history, and problem list.    Vitals:    07/31/24 0816   BP: 106/64   BP Location: Right arm   Patient Position: Sitting   Cuff Size: Adult   Pulse: 76   Temp: 98 °F (36.7 °C)   TempSrc: Infrared   SpO2: 96%   Weight: 92.1 kg (203 lb)   Height: 182.9 cm (72.01\")     Body mass index is 27.53 kg/m².  BMI is >= 25 and <30. (Overweight) The following options were offered after discussion;: exercise counseling/recommendations       Past Medical History:   Diagnosis Date    Hypertension      Past Surgical History:   Procedure Laterality Date    VASECTOMY       Family History   Problem Relation Age of Onset    Diabetes Father  "     Immunization History   Administered Date(s) Administered    COVID-19 (PFIZER) Purple Cap Monovalent 03/17/2021, 04/07/2021, 10/09/2021       Office Visit on 01/29/2024   Component Date Value Ref Range Status    WBC 01/29/2024 6.3  3.4 - 10.8 x10E3/uL Final    RBC 01/29/2024 4.60  4.14 - 5.80 x10E6/uL Final    Hemoglobin 01/29/2024 14.6  13.0 - 17.7 g/dL Final    Hematocrit 01/29/2024 42.8  37.5 - 51.0 % Final    MCV 01/29/2024 93  79 - 97 fL Final    MCH 01/29/2024 31.7  26.6 - 33.0 pg Final    MCHC 01/29/2024 34.1  31.5 - 35.7 g/dL Final    RDW 01/29/2024 12.5  11.6 - 15.4 % Final    Platelets 01/29/2024 271  150 - 450 x10E3/uL Final    Neutrophil Rel % 01/29/2024 41  Not Estab. % Final    Lymphocyte Rel % 01/29/2024 49  Not Estab. % Final    Monocyte Rel % 01/29/2024 7  Not Estab. % Final    Eosinophil Rel % 01/29/2024 2  Not Estab. % Final    Basophil Rel % 01/29/2024 1  Not Estab. % Final    Neutrophils Absolute 01/29/2024 2.6  1.4 - 7.0 x10E3/uL Final    Lymphocytes Absolute 01/29/2024 3.1  0.7 - 3.1 x10E3/uL Final    Monocytes Absolute 01/29/2024 0.4  0.1 - 0.9 x10E3/uL Final    Eosinophils Absolute 01/29/2024 0.1  0.0 - 0.4 x10E3/uL Final    Basophils Absolute 01/29/2024 0.0  0.0 - 0.2 x10E3/uL Final    Immature Granulocyte Rel % 01/29/2024 0  Not Estab. % Final    Immature Grans Absolute 01/29/2024 0.0  0.0 - 0.1 x10E3/uL Final    Glucose 01/29/2024 107 (H)  70 - 99 mg/dL Final    BUN 01/29/2024 21  6 - 24 mg/dL Final    Creatinine 01/29/2024 1.06  0.76 - 1.27 mg/dL Final    EGFR Result 01/29/2024 82  >59 mL/min/1.73 Final    BUN/Creatinine Ratio 01/29/2024 20  9 - 20 Final    Sodium 01/29/2024 142  134 - 144 mmol/L Final    Potassium 01/29/2024 5.2  3.5 - 5.2 mmol/L Final    Chloride 01/29/2024 103  96 - 106 mmol/L Final    Total CO2 01/29/2024 23  20 - 29 mmol/L Final    Calcium 01/29/2024 9.8  8.7 - 10.2 mg/dL Final    Total Protein 01/29/2024 7.0  6.0 - 8.5 g/dL Final    Albumin 01/29/2024 5.0 (H)   3.8 - 4.9 g/dL Final    Globulin 01/29/2024 2.0  1.5 - 4.5 g/dL Final    A/G Ratio 01/29/2024 2.5 (H)  1.2 - 2.2 Final    Total Bilirubin 01/29/2024 <0.2  0.0 - 1.2 mg/dL Final    Alkaline Phosphatase 01/29/2024 62  44 - 121 IU/L Final    AST (SGOT) 01/29/2024 17  0 - 40 IU/L Final    ALT (SGPT) 01/29/2024 17  0 - 44 IU/L Final    Total Cholesterol 01/29/2024 185  100 - 199 mg/dL Final    Triglycerides 01/29/2024 144  0 - 149 mg/dL Final    HDL Cholesterol 01/29/2024 46  >39 mg/dL Final    VLDL Cholesterol [place] 01/29/2024 26  5 - 40 mg/dL Final    LDL Chol Calc (NIH) 01/29/2024 113 (H)  0 - 99 mg/dL Final    LDL/HDL RATIO 01/29/2024 2.5  0.0 - 3.6 ratio Final    Comment:                                     LDL/HDL Ratio                                              Men  Women                                1/2 Avg.Risk  1.0    1.5                                    Avg.Risk  3.6    3.2                                 2X Avg.Risk  6.2    5.0                                 3X Avg.Risk  8.0    6.1      Hemoglobin A1C 01/29/2024 5.7 (H)  4.8 - 5.6 % Final    Comment:          Prediabetes: 5.7 - 6.4           Diabetes: >6.4           Glycemic control for adults with diabetes: <7.0           Review of Systems   Constitutional: Negative.    HENT: Negative.     Respiratory: Negative.     Cardiovascular: Negative.    Gastrointestinal: Negative.    Genitourinary: Negative.    Musculoskeletal:         Bilateral upper leg pain   Skin: Negative.    Neurological: Negative.    Psychiatric/Behavioral: Negative.         Objective   Physical Exam  Constitutional:       Appearance: Normal appearance.   HENT:      Head: Normocephalic.   Cardiovascular:      Rate and Rhythm: Normal rate and regular rhythm.      Pulses: Normal pulses.      Heart sounds: Normal heart sounds.   Pulmonary:      Effort: Pulmonary effort is normal.      Breath sounds: Normal breath sounds.   Abdominal:      General: Bowel sounds are normal.   Musculoskeletal:          General: Normal range of motion.   Skin:     General: Skin is warm.   Neurological:      General: No focal deficit present.      Mental Status: He is alert.      Comments: Bilateral upper thigh pain that sounds like neuropathy   Psychiatric:         Mood and Affect: Mood normal.         Behavior: Behavior normal.         Procedures    Assessment & Plan   Diagnoses and all orders for this visit:    1. Elevated hemoglobin A1c (Primary)  -     Comprehensive Metabolic Panel  -     Hemoglobin A1c    2. Mixed hyperlipidemia  -     Lipid Panel With LDL / HDL Ratio    3. Encounter for preventive care  -     CBC & Differential  -     TSH+Free T4  -     T3    4. Screening PSA (prostate specific antigen)  -     PSA Screen    5. Acute midline low back pain with bilateral sciatica  -     XR Spine Lumbar 2 or 3 View; Future    6. Overweight with body mass index (BMI) of 27 to 27.9 in adult    7. Pain of lateral upper thigh    Other orders  -     gabapentin (NEURONTIN) 300 MG capsule; Take 1 capsule by mouth 3 (Three) Times a Day.  Dispense: 30 capsule; Refill: 2  -     Magnesium 400 MG tablet; Take 400 mg by mouth 2 (Two) Times a Day.  Dispense: 60 tablet; Refill: 3  -     predniSONE 5 MG (48) tablet therapy pack dose pack; Take 4 tablets by mouth Daily for 2 days, THEN 3 tablets Daily for 2 days, THEN 2 tablets Daily for 2 days, THEN 1 tablet Daily for 2 days.  Dispense: 20 tablet; Refill: 0           Current Outpatient Medications:     gabapentin (NEURONTIN) 300 MG capsule, Take 1 capsule by mouth 3 (Three) Times a Day., Disp: 30 capsule, Rfl: 2    Magnesium 400 MG tablet, Take 400 mg by mouth 2 (Two) Times a Day., Disp: 60 tablet, Rfl: 3    predniSONE 5 MG (48) tablet therapy pack dose pack, Take 4 tablets by mouth Daily for 2 days, THEN 3 tablets Daily for 2 days, THEN 2 tablets Daily for 2 days, THEN 1 tablet Daily for 2 days., Disp: 20 tablet, Rfl: 0           BINH Todd 7/31/2024 10:09 EDT  This note has  been electronically signed

## 2024-08-01 LAB
ALBUMIN SERPL-MCNC: 4.6 G/DL (ref 3.8–4.9)
ALP SERPL-CCNC: 70 IU/L (ref 44–121)
ALT SERPL-CCNC: 16 IU/L (ref 0–44)
AST SERPL-CCNC: 15 IU/L (ref 0–40)
BASOPHILS # BLD AUTO: 0 X10E3/UL (ref 0–0.2)
BASOPHILS NFR BLD AUTO: 1 %
BILIRUB SERPL-MCNC: 0.5 MG/DL (ref 0–1.2)
BUN SERPL-MCNC: 21 MG/DL (ref 6–24)
BUN/CREAT SERPL: 18 (ref 9–20)
CALCIUM SERPL-MCNC: 9.7 MG/DL (ref 8.7–10.2)
CHLORIDE SERPL-SCNC: 101 MMOL/L (ref 96–106)
CHOLEST SERPL-MCNC: 220 MG/DL (ref 100–199)
CO2 SERPL-SCNC: 23 MMOL/L (ref 20–29)
CREAT SERPL-MCNC: 1.2 MG/DL (ref 0.76–1.27)
EGFRCR SERPLBLD CKD-EPI 2021: 71 ML/MIN/1.73
EOSINOPHIL # BLD AUTO: 0.1 X10E3/UL (ref 0–0.4)
EOSINOPHIL NFR BLD AUTO: 2 %
ERYTHROCYTE [DISTWIDTH] IN BLOOD BY AUTOMATED COUNT: 13.6 % (ref 11.6–15.4)
GLOBULIN SER CALC-MCNC: 2.6 G/DL (ref 1.5–4.5)
GLUCOSE SERPL-MCNC: 96 MG/DL (ref 70–99)
HBA1C MFR BLD: 5.8 % (ref 4.8–5.6)
HCT VFR BLD AUTO: 45.4 % (ref 37.5–51)
HDLC SERPL-MCNC: 43 MG/DL
HGB BLD-MCNC: 14.7 G/DL (ref 13–17.7)
IMM GRANULOCYTES # BLD AUTO: 0 X10E3/UL (ref 0–0.1)
IMM GRANULOCYTES NFR BLD AUTO: 0 %
LDLC SERPL CALC-MCNC: 135 MG/DL (ref 0–99)
LDLC/HDLC SERPL: 3.1 RATIO (ref 0–3.6)
LYMPHOCYTES # BLD AUTO: 3.1 X10E3/UL (ref 0.7–3.1)
LYMPHOCYTES NFR BLD AUTO: 48 %
MCH RBC QN AUTO: 31.3 PG (ref 26.6–33)
MCHC RBC AUTO-ENTMCNC: 32.4 G/DL (ref 31.5–35.7)
MCV RBC AUTO: 97 FL (ref 79–97)
MONOCYTES # BLD AUTO: 0.5 X10E3/UL (ref 0.1–0.9)
MONOCYTES NFR BLD AUTO: 8 %
NEUTROPHILS # BLD AUTO: 2.7 X10E3/UL (ref 1.4–7)
NEUTROPHILS NFR BLD AUTO: 41 %
PLATELET # BLD AUTO: 253 X10E3/UL (ref 150–450)
POTASSIUM SERPL-SCNC: 5.1 MMOL/L (ref 3.5–5.2)
PROT SERPL-MCNC: 7.2 G/DL (ref 6–8.5)
PSA SERPL-MCNC: 1.2 NG/ML (ref 0–4)
RBC # BLD AUTO: 4.69 X10E6/UL (ref 4.14–5.8)
SODIUM SERPL-SCNC: 139 MMOL/L (ref 134–144)
T3 SERPL-MCNC: 114 NG/DL (ref 71–180)
T4 FREE SERPL-MCNC: 1.07 NG/DL (ref 0.82–1.77)
TRIGL SERPL-MCNC: 236 MG/DL (ref 0–149)
TSH SERPL DL<=0.005 MIU/L-ACNC: 1.42 UIU/ML (ref 0.45–4.5)
VLDLC SERPL CALC-MCNC: 42 MG/DL (ref 5–40)
WBC # BLD AUTO: 6.5 X10E3/UL (ref 3.4–10.8)

## 2025-02-05 ENCOUNTER — OFFICE VISIT (OUTPATIENT)
Dept: FAMILY MEDICINE CLINIC | Facility: CLINIC | Age: 60
End: 2025-02-05
Payer: COMMERCIAL

## 2025-02-05 VITALS
WEIGHT: 207 LBS | DIASTOLIC BLOOD PRESSURE: 68 MMHG | OXYGEN SATURATION: 98 % | TEMPERATURE: 97.3 F | SYSTOLIC BLOOD PRESSURE: 124 MMHG | HEIGHT: 72 IN | HEART RATE: 65 BPM | BODY MASS INDEX: 28.04 KG/M2

## 2025-02-05 DIAGNOSIS — N52.8 OTHER MALE ERECTILE DYSFUNCTION: ICD-10-CM

## 2025-02-05 DIAGNOSIS — R73.09 ELEVATED HEMOGLOBIN A1C: ICD-10-CM

## 2025-02-05 DIAGNOSIS — E78.2 MIXED HYPERLIPIDEMIA: ICD-10-CM

## 2025-02-05 DIAGNOSIS — E66.3 OVERWEIGHT WITH BODY MASS INDEX (BMI) OF 28 TO 28.9 IN ADULT: Primary | ICD-10-CM

## 2025-02-05 PROCEDURE — 99213 OFFICE O/P EST LOW 20 MIN: CPT | Performed by: NURSE PRACTITIONER

## 2025-02-05 RX ORDER — SILDENAFIL 100 MG/1
100 TABLET, FILM COATED ORAL DAILY PRN
Qty: 30 TABLET | Refills: 2 | Status: SHIPPED | OUTPATIENT
Start: 2025-02-05

## 2025-02-05 NOTE — PROGRESS NOTES
"    Gomez Mercedes is a 59 y.o. male.     History of Present Illness  59-year-old white male with history of GERD, ED, hypertension and mild BPH who comes in today for 6-month follow-up visit    Blood pressure 124/68 heart rate 64 he denies any chest pain, dyspnea, tachycardia or dizziness    Patient's only complaint is still having insomnia not able to fall asleep.  He is under a lot of normal stress from work etc.  He has been taking Benadryl overnight which he states helps him sleep really well.    Last blood work was stable with exception of lipid panel will be doing fasting labs today.  Weight is 207 with a BMI of 28.1.  He has had 3 COVID vaccines has not been vaccinated for influenza and is up-to-date on eye exams.  PSA is due in July 2025 his next colonoscopy is due in 2031          Fasting blood work  Viagra 100 mg 1/4-1 as needed  Follow-up 6 months           The following portions of the patient's history were reviewed and updated as appropriate: allergies, current medications, past family history, past medical history, past social history, past surgical history, and problem list.    Vitals:    02/05/25 0803   BP: 124/68   BP Location: Right arm   Patient Position: Sitting   Cuff Size: Adult   Pulse: 65   Temp: 97.3 °F (36.3 °C)   TempSrc: Infrared   SpO2: 98%   Weight: 93.9 kg (207 lb)   Height: 182.9 cm (72.01\")       Past Medical History:   Diagnosis Date    Hypertension      Past Surgical History:   Procedure Laterality Date    VASECTOMY       Family History   Problem Relation Age of Onset    Diabetes Father      Immunization History   Administered Date(s) Administered    COVID-19 (PFIZER) Purple Cap Monovalent 03/17/2021, 04/07/2021, 10/09/2021       Admission on 08/29/2024, Discharged on 08/29/2024   Component Date Value Ref Range Status    SARS Antigen 08/29/2024 Not Detected  Not Detected, Presumptive Negative Final    Influenza A Antigen SHINE 08/29/2024 Not Detected  Not Detected Final    " Influenza B Antigen SHINE 08/29/2024 Not Detected  Not Detected Final    Internal Control 08/29/2024 Passed  Passed Final    Lot Number 08/29/2024 [id number]   Final    Expiration Date 08/29/2024 10/18/2025   Final         Review of Systems   Constitutional: Negative.    HENT: Negative.     Respiratory: Negative.     Cardiovascular: Negative.    Gastrointestinal: Negative.    Genitourinary: Negative.    Musculoskeletal: Negative.    Skin: Negative.    Neurological: Negative.    Psychiatric/Behavioral:  Positive for sleep disturbance.        Objective   Physical Exam  Constitutional:       Appearance: Normal appearance.   HENT:      Head: Normocephalic.   Cardiovascular:      Rate and Rhythm: Normal rate and regular rhythm.      Pulses: Normal pulses.      Heart sounds: Normal heart sounds.   Pulmonary:      Effort: Pulmonary effort is normal.      Breath sounds: Normal breath sounds.   Abdominal:      General: Bowel sounds are normal.   Musculoskeletal:         General: Normal range of motion.   Skin:     General: Skin is warm.   Neurological:      General: No focal deficit present.      Mental Status: He is alert and oriented to person, place, and time.   Psychiatric:         Mood and Affect: Mood normal.         Behavior: Behavior normal.         Procedures    Assessment & Plan   Diagnoses and all orders for this visit:    1. Overweight with body mass index (BMI) of 28 to 28.9 in adult (Primary)    2. Elevated hemoglobin A1c  -     Comprehensive Metabolic Panel  -     Hemoglobin A1c    3. Mixed hyperlipidemia  -     Lipid Panel With LDL / HDL Ratio    Other orders  -     sildenafil (Viagra) 100 MG tablet; Take 1 tablet by mouth Daily As Needed for Erectile Dysfunction.  Dispense: 30 tablet; Refill: 2           Current Outpatient Medications:     sildenafil (Viagra) 100 MG tablet, Take 1 tablet by mouth Daily As Needed for Erectile Dysfunction., Disp: 30 tablet, Rfl: 2           Nicki Galloway, BINH 2/5/2025 08:33  EST  This note has been electronically signed

## 2025-02-06 LAB
ALBUMIN SERPL-MCNC: 4.7 G/DL (ref 3.8–4.9)
ALP SERPL-CCNC: 70 IU/L (ref 44–121)
ALT SERPL-CCNC: 19 IU/L (ref 0–44)
AST SERPL-CCNC: 19 IU/L (ref 0–40)
BILIRUB SERPL-MCNC: 0.6 MG/DL (ref 0–1.2)
BUN SERPL-MCNC: 16 MG/DL (ref 6–24)
BUN/CREAT SERPL: 13 (ref 9–20)
CALCIUM SERPL-MCNC: 9.7 MG/DL (ref 8.7–10.2)
CHLORIDE SERPL-SCNC: 99 MMOL/L (ref 96–106)
CHOLEST SERPL-MCNC: 186 MG/DL (ref 100–199)
CO2 SERPL-SCNC: 26 MMOL/L (ref 20–29)
CREAT SERPL-MCNC: 1.2 MG/DL (ref 0.76–1.27)
EGFRCR SERPLBLD CKD-EPI 2021: 71 ML/MIN/1.73
GLOBULIN SER CALC-MCNC: 2.2 G/DL (ref 1.5–4.5)
GLUCOSE SERPL-MCNC: 97 MG/DL (ref 70–99)
HBA1C MFR BLD: 5.9 % (ref 4.8–5.6)
HDLC SERPL-MCNC: 44 MG/DL
LDLC SERPL CALC-MCNC: 108 MG/DL (ref 0–99)
LDLC/HDLC SERPL: 2.5 RATIO (ref 0–3.6)
POTASSIUM SERPL-SCNC: 5 MMOL/L (ref 3.5–5.2)
PROT SERPL-MCNC: 6.9 G/DL (ref 6–8.5)
SODIUM SERPL-SCNC: 137 MMOL/L (ref 134–144)
TRIGL SERPL-MCNC: 194 MG/DL (ref 0–149)
VLDLC SERPL CALC-MCNC: 34 MG/DL (ref 5–40)

## 2025-02-20 ENCOUNTER — TELEPHONE (OUTPATIENT)
Dept: FAMILY MEDICINE CLINIC | Facility: CLINIC | Age: 60
End: 2025-02-20

## 2025-02-20 RX ORDER — TADALAFIL 20 MG/1
20 TABLET ORAL DAILY PRN
Qty: 90 TABLET | Refills: 0 | Status: SHIPPED | OUTPATIENT
Start: 2025-02-20

## 2025-02-20 NOTE — TELEPHONE ENCOUNTER
"Caller: Gomez Mercedes \"Tera\"    Relationship: Self    Best call back number: [phone]    What medication are you requesting:   TADALAFIL 20 MG     If a prescription is needed, what is your preferred pharmacy and phone number: [id number] - [place], [place] - [address] AT SEC OF [facility] & [facility] [phone] [facility] [phone]     Additional notes: PT STATES THAT HE USED TO TAKE THIS MEDICATION AND WAS SWITCHED TO sildenafil (Viagra) 100 MG tablet, BUT CANNOT TAKE IT DUE TO SIDE EFFECTS.     "

## 2025-08-07 ENCOUNTER — LAB (OUTPATIENT)
Dept: LAB | Facility: HOSPITAL | Age: 60
End: 2025-08-07
Payer: COMMERCIAL

## 2025-08-07 ENCOUNTER — OFFICE VISIT (OUTPATIENT)
Dept: FAMILY MEDICINE CLINIC | Facility: CLINIC | Age: 60
End: 2025-08-07
Payer: COMMERCIAL

## 2025-08-07 VITALS
TEMPERATURE: 97.1 F | WEIGHT: 209.6 LBS | OXYGEN SATURATION: 98 % | HEIGHT: 72 IN | BODY MASS INDEX: 28.39 KG/M2 | HEART RATE: 65 BPM | SYSTOLIC BLOOD PRESSURE: 150 MMHG | DIASTOLIC BLOOD PRESSURE: 82 MMHG

## 2025-08-07 DIAGNOSIS — R73.09 ELEVATED HEMOGLOBIN A1C: ICD-10-CM

## 2025-08-07 DIAGNOSIS — G47.00 INSOMNIA, UNSPECIFIED TYPE: ICD-10-CM

## 2025-08-07 DIAGNOSIS — R53.83 OTHER FATIGUE: ICD-10-CM

## 2025-08-07 DIAGNOSIS — Z13.220 LIPID SCREENING: ICD-10-CM

## 2025-08-07 DIAGNOSIS — H53.9 VISUAL DISTURBANCE: ICD-10-CM

## 2025-08-07 DIAGNOSIS — Z00.00 PREVENTATIVE HEALTH CARE: ICD-10-CM

## 2025-08-07 DIAGNOSIS — Z12.5 SCREENING PSA (PROSTATE SPECIFIC ANTIGEN): Primary | ICD-10-CM

## 2025-08-07 DIAGNOSIS — E66.3 OVERWEIGHT WITH BODY MASS INDEX (BMI) OF 28 TO 28.9 IN ADULT: ICD-10-CM

## 2025-08-07 DIAGNOSIS — Z12.5 SCREENING PSA (PROSTATE SPECIFIC ANTIGEN): ICD-10-CM

## 2025-08-07 DIAGNOSIS — L85.3 DRY SKIN: ICD-10-CM

## 2025-08-07 LAB
ALBUMIN SERPL-MCNC: 4.5 G/DL (ref 3.5–5.2)
ALBUMIN/GLOB SERPL: 1.7 G/DL
ALP SERPL-CCNC: 65 U/L (ref 39–117)
ALT SERPL W P-5'-P-CCNC: 22 U/L (ref 1–41)
ANION GAP SERPL CALCULATED.3IONS-SCNC: 11.9 MMOL/L (ref 5–15)
ANISOCYTOSIS BLD QL: ABNORMAL
AST SERPL-CCNC: 19 U/L (ref 1–40)
BILIRUB SERPL-MCNC: 0.7 MG/DL (ref 0–1.2)
BUN SERPL-MCNC: 16 MG/DL (ref 6–20)
BUN/CREAT SERPL: 15.5 (ref 7–25)
CALCIUM SPEC-SCNC: 9.7 MG/DL (ref 8.6–10.5)
CHLORIDE SERPL-SCNC: 102 MMOL/L (ref 98–107)
CHOLEST SERPL-MCNC: 182 MG/DL (ref 0–200)
CO2 SERPL-SCNC: 26.1 MMOL/L (ref 22–29)
CREAT SERPL-MCNC: 1.03 MG/DL (ref 0.76–1.27)
DEPRECATED RDW RBC AUTO: 44.9 FL (ref 37–54)
EGFRCR SERPLBLD CKD-EPI 2021: 84.7 ML/MIN/1.73
EOSINOPHIL # BLD MANUAL: 0.12 10*3/MM3 (ref 0–0.4)
EOSINOPHIL NFR BLD MANUAL: 2 % (ref 0.3–6.2)
ERYTHROCYTE [DISTWIDTH] IN BLOOD BY AUTOMATED COUNT: 13.2 % (ref 12.3–15.4)
GLOBULIN UR ELPH-MCNC: 2.7 GM/DL
GLUCOSE SERPL-MCNC: 95 MG/DL (ref 65–99)
HBA1C MFR BLD: 5.65 % (ref 4.8–5.6)
HCT VFR BLD AUTO: 45.3 % (ref 37.5–51)
HDLC SERPL-MCNC: 41 MG/DL (ref 40–60)
HGB BLD-MCNC: 14.5 G/DL (ref 13–17.7)
LDLC SERPL CALC-MCNC: 115 MG/DL (ref 0–100)
LDLC/HDLC SERPL: 2.74 {RATIO}
LYMPHOCYTES # BLD MANUAL: 3.45 10*3/MM3 (ref 0.7–3.1)
LYMPHOCYTES NFR BLD MANUAL: 9 % (ref 5–12)
MCH RBC QN AUTO: 29.7 PG (ref 26.6–33)
MCHC RBC AUTO-ENTMCNC: 32 G/DL (ref 31.5–35.7)
MCV RBC AUTO: 92.6 FL (ref 79–97)
MONOCYTES # BLD: 0.54 10*3/MM3 (ref 0.1–0.9)
NEUTROPHILS # BLD AUTO: 1.94 10*3/MM3 (ref 1.7–7)
NEUTROPHILS NFR BLD MANUAL: 32 % (ref 42.7–76)
PLAT MORPH BLD: NORMAL
PLATELET # BLD AUTO: 250 10*3/MM3 (ref 140–450)
PMV BLD AUTO: 10.4 FL (ref 6–12)
POTASSIUM SERPL-SCNC: 4.8 MMOL/L (ref 3.5–5.2)
PROT SERPL-MCNC: 7.2 G/DL (ref 6–8.5)
PSA SERPL-MCNC: 1.18 NG/ML (ref 0–4)
RBC # BLD AUTO: 4.89 10*6/MM3 (ref 4.14–5.8)
SODIUM SERPL-SCNC: 140 MMOL/L (ref 136–145)
T3 SERPL-MCNC: 144 NG/DL (ref 80–200)
T4 FREE SERPL-MCNC: 1 NG/DL (ref 0.92–1.68)
TRIGL SERPL-MCNC: 144 MG/DL (ref 0–150)
TSH SERPL DL<=0.05 MIU/L-ACNC: 1.11 UIU/ML (ref 0.27–4.2)
VARIANT LYMPHS NFR BLD MANUAL: 57 % (ref 19.6–45.3)
VLDLC SERPL-MCNC: 26 MG/DL (ref 5–40)
WBC MORPH BLD: NORMAL
WBC NRBC COR # BLD AUTO: 6.05 10*3/MM3 (ref 3.4–10.8)

## 2025-08-07 PROCEDURE — 80061 LIPID PANEL: CPT

## 2025-08-07 PROCEDURE — G0103 PSA SCREENING: HCPCS

## 2025-08-07 PROCEDURE — 84439 ASSAY OF FREE THYROXINE: CPT

## 2025-08-07 PROCEDURE — 36415 COLL VENOUS BLD VENIPUNCTURE: CPT

## 2025-08-07 PROCEDURE — 85027 COMPLETE CBC AUTOMATED: CPT

## 2025-08-07 PROCEDURE — 84443 ASSAY THYROID STIM HORMONE: CPT

## 2025-08-07 PROCEDURE — 84480 ASSAY TRIIODOTHYRONINE (T3): CPT

## 2025-08-07 PROCEDURE — 80053 COMPREHEN METABOLIC PANEL: CPT

## 2025-08-07 PROCEDURE — 83036 HEMOGLOBIN GLYCOSYLATED A1C: CPT

## 2025-08-07 PROCEDURE — 85007 BL SMEAR W/DIFF WBC COUNT: CPT

## 2025-08-07 RX ORDER — HYDROXYZINE HYDROCHLORIDE 50 MG/1
TABLET, FILM COATED ORAL
Qty: 14 TABLET | Refills: 0 | Status: SHIPPED | OUTPATIENT
Start: 2025-08-07